# Patient Record
Sex: FEMALE | Race: WHITE | NOT HISPANIC OR LATINO | Employment: FULL TIME | URBAN - METROPOLITAN AREA
[De-identification: names, ages, dates, MRNs, and addresses within clinical notes are randomized per-mention and may not be internally consistent; named-entity substitution may affect disease eponyms.]

---

## 2017-01-11 ENCOUNTER — E-ADVICE (OUTPATIENT)
Dept: ENDOCRINOLOGY | Age: 27
End: 2017-01-11

## 2017-02-23 ENCOUNTER — APPOINTMENT (EMERGENCY)
Dept: RADIOLOGY | Facility: HOSPITAL | Age: 27
End: 2017-02-23
Payer: COMMERCIAL

## 2017-02-23 ENCOUNTER — HOSPITAL ENCOUNTER (EMERGENCY)
Facility: HOSPITAL | Age: 27
Discharge: HOME | End: 2017-02-23
Attending: EMERGENCY MEDICINE | Admitting: EMERGENCY MEDICINE
Payer: COMMERCIAL

## 2017-02-23 VITALS
SYSTOLIC BLOOD PRESSURE: 132 MMHG | HEART RATE: 118 BPM | BODY MASS INDEX: 21.26 KG/M2 | OXYGEN SATURATION: 94 % | TEMPERATURE: 99.6 F | RESPIRATION RATE: 20 BRPM | DIASTOLIC BLOOD PRESSURE: 72 MMHG | WEIGHT: 120 LBS

## 2017-02-23 DIAGNOSIS — J45.909 ASTHMA: Primary | ICD-10-CM

## 2017-02-23 LAB
ANION GAP SERPL CALCULATED.3IONS-SCNC: 9 MMOL/L (ref 4–13)
BASOPHILS # BLD AUTO: 0 THOUSANDS/ΜL (ref 0–0.1)
BASOPHILS NFR BLD AUTO: 1 % (ref 0–1)
BUN SERPL-MCNC: 7 MG/DL (ref 5–25)
CALCIUM SERPL-MCNC: 8.7 MG/DL (ref 8.3–10.1)
CHLORIDE SERPL-SCNC: 102 MMOL/L (ref 100–108)
CO2 SERPL-SCNC: 25 MMOL/L (ref 21–32)
CREAT SERPL-MCNC: 0.79 MG/DL (ref 0.6–1.3)
EOSINOPHIL # BLD AUTO: 0.4 THOUSAND/ΜL (ref 0–0.61)
EOSINOPHIL NFR BLD AUTO: 9 % (ref 0–6)
ERYTHROCYTE [DISTWIDTH] IN BLOOD BY AUTOMATED COUNT: 13.3 % (ref 11.6–15.1)
FLUAV AG SPEC QL IA: NEGATIVE
FLUAV AG SPEC QL: NORMAL
FLUBV AG SPEC QL IA: NEGATIVE
FLUBV AG SPEC QL: NORMAL
GFR SERPL CREATININE-BSD FRML MDRD: >60 ML/MIN/1.73SQ M
GLUCOSE SERPL-MCNC: 104 MG/DL (ref 65–140)
HCG SERPL QL: NEGATIVE
HCT VFR BLD AUTO: 41.3 % (ref 37–47)
HGB BLD-MCNC: 13.7 G/DL (ref 12–16)
INTERNAL QC RESULT: NORMAL
LYMPHOCYTES # BLD AUTO: 0.3 THOUSANDS/ΜL (ref 0.6–4.47)
LYMPHOCYTES NFR BLD AUTO: 6 % (ref 14–44)
MCH RBC QN AUTO: 28.3 PG (ref 27–31)
MCHC RBC AUTO-ENTMCNC: 33.2 G/DL (ref 31.4–37.4)
MCV RBC AUTO: 85 FL (ref 82–98)
MONOCYTES # BLD AUTO: 0.7 THOUSAND/ΜL (ref 0.17–1.22)
MONOCYTES NFR BLD AUTO: 15 % (ref 4–12)
NEUTROPHILS # BLD AUTO: 3.3 THOUSANDS/ΜL (ref 1.85–7.62)
NEUTS SEG NFR BLD AUTO: 69 % (ref 43–75)
NRBC BLD AUTO-RTO: 0 /100 WBCS
PLATELET # BLD AUTO: 160 THOUSANDS/UL (ref 130–400)
PMV BLD AUTO: 8.5 FL (ref 8.9–12.7)
POTASSIUM SERPL-SCNC: 3.7 MMOL/L (ref 3.5–5.3)
RBC # BLD AUTO: 4.84 MILLION/UL (ref 4.2–5.4)
RSV B RNA SPEC QL NAA+PROBE: NORMAL
SODIUM SERPL-SCNC: 136 MMOL/L (ref 136–145)
WBC # BLD AUTO: 4.8 THOUSAND/UL (ref 4.8–10.8)

## 2017-02-23 PROCEDURE — 85025 COMPLETE CBC W/AUTO DIFF WBC: CPT | Performed by: EMERGENCY MEDICINE

## 2017-02-23 PROCEDURE — 87400 INFLUENZA A/B EACH AG IA: CPT | Performed by: EMERGENCY MEDICINE

## 2017-02-23 PROCEDURE — 94664 DEMO&/EVAL PT USE INHALER: CPT

## 2017-02-23 PROCEDURE — 87798 DETECT AGENT NOS DNA AMP: CPT | Performed by: EMERGENCY MEDICINE

## 2017-02-23 PROCEDURE — 99285 EMERGENCY DEPT VISIT HI MDM: CPT

## 2017-02-23 PROCEDURE — 94640 AIRWAY INHALATION TREATMENT: CPT

## 2017-02-23 PROCEDURE — 36415 COLL VENOUS BLD VENIPUNCTURE: CPT | Performed by: EMERGENCY MEDICINE

## 2017-02-23 PROCEDURE — 71010 HB CHEST X-RAY 1 VIEW FRONTAL (PORTABLE): CPT

## 2017-02-23 PROCEDURE — 96361 HYDRATE IV INFUSION ADD-ON: CPT

## 2017-02-23 PROCEDURE — 80048 BASIC METABOLIC PNL TOTAL CA: CPT | Performed by: EMERGENCY MEDICINE

## 2017-02-23 PROCEDURE — 84703 CHORIONIC GONADOTROPIN ASSAY: CPT | Performed by: EMERGENCY MEDICINE

## 2017-02-23 PROCEDURE — 96374 THER/PROPH/DIAG INJ IV PUSH: CPT

## 2017-02-23 RX ORDER — METHYLPREDNISOLONE SODIUM SUCCINATE 125 MG/2ML
125 INJECTION, POWDER, LYOPHILIZED, FOR SOLUTION INTRAMUSCULAR; INTRAVENOUS ONCE
Status: COMPLETED | OUTPATIENT
Start: 2017-02-23 | End: 2017-02-23

## 2017-02-23 RX ORDER — ALBUTEROL SULFATE 90 UG/1
2 AEROSOL, METERED RESPIRATORY (INHALATION) EVERY 4 HOURS PRN
Qty: 1 INHALER | Refills: 0 | Status: SHIPPED | OUTPATIENT
Start: 2017-02-23 | End: 2017-03-25

## 2017-02-23 RX ORDER — AZITHROMYCIN 250 MG/1
250 TABLET, FILM COATED ORAL DAILY
Qty: 10 TABLET | Refills: 0 | Status: SHIPPED | OUTPATIENT
Start: 2017-02-23 | End: 2017-03-05

## 2017-02-23 RX ORDER — PREDNISONE 50 MG/1
50 TABLET ORAL DAILY
Qty: 5 TABLET | Refills: 0 | Status: SHIPPED | OUTPATIENT
Start: 2017-02-23 | End: 2017-02-28

## 2017-02-23 RX ADMIN — IPRATROPIUM BROMIDE 1 MG: 0.5 SOLUTION RESPIRATORY (INHALATION) at 08:03

## 2017-02-23 RX ADMIN — METHYLPREDNISOLONE SODIUM SUCCINATE 125 MG: 125 INJECTION, POWDER, FOR SOLUTION INTRAMUSCULAR; INTRAVENOUS at 08:03

## 2017-02-23 RX ADMIN — ALBUTEROL SULFATE 10 MG: 2.5 SOLUTION RESPIRATORY (INHALATION) at 08:02

## 2017-02-23 RX ADMIN — SODIUM CHLORIDE 1000 ML: 0.9 INJECTION, SOLUTION INTRAVENOUS at 08:01

## 2018-02-17 ENCOUNTER — HOSPITAL ENCOUNTER (EMERGENCY)
Facility: HOSPITAL | Age: 28
Discharge: HOME | End: 2018-02-17
Attending: EMERGENCY MEDICINE
Payer: COMMERCIAL

## 2018-02-17 ENCOUNTER — APPOINTMENT (EMERGENCY)
Dept: RADIOLOGY | Facility: HOSPITAL | Age: 28
End: 2018-02-17
Payer: COMMERCIAL

## 2018-02-17 VITALS
TEMPERATURE: 99.8 F | HEART RATE: 114 BPM | SYSTOLIC BLOOD PRESSURE: 115 MMHG | HEIGHT: 63 IN | WEIGHT: 120 LBS | OXYGEN SATURATION: 95 % | BODY MASS INDEX: 21.26 KG/M2 | RESPIRATION RATE: 20 BRPM | DIASTOLIC BLOOD PRESSURE: 68 MMHG

## 2018-02-17 DIAGNOSIS — J18.9 LEFT LOWER LOBE PNEUMONIA: Primary | ICD-10-CM

## 2018-02-17 DIAGNOSIS — R11.10 VOMITING: ICD-10-CM

## 2018-02-17 DIAGNOSIS — E87.6 HYPOKALEMIA: ICD-10-CM

## 2018-02-17 LAB
ALBUMIN SERPL BCP-MCNC: 3.6 G/DL (ref 3.5–5)
ALP SERPL-CCNC: 57 U/L (ref 46–116)
ALT SERPL W P-5'-P-CCNC: 18 U/L (ref 12–78)
ANION GAP SERPL CALCULATED.3IONS-SCNC: 10 MMOL/L (ref 4–13)
AST SERPL W P-5'-P-CCNC: 19 U/L (ref 5–45)
BASOPHILS # BLD AUTO: 0 THOUSANDS/ΜL (ref 0–0.1)
BASOPHILS NFR BLD AUTO: 0 % (ref 0–1)
BILIRUB SERPL-MCNC: 0.3 MG/DL (ref 0.2–1)
BILIRUB UR QL STRIP: NEGATIVE
BUN SERPL-MCNC: 5 MG/DL (ref 5–25)
CALCIUM SERPL-MCNC: 8.7 MG/DL (ref 8.3–10.1)
CHLORIDE SERPL-SCNC: 104 MMOL/L (ref 100–108)
CLARITY UR: CLEAR
CO2 SERPL-SCNC: 26 MMOL/L (ref 21–32)
COLOR UR: YELLOW
CREAT SERPL-MCNC: 0.89 MG/DL (ref 0.6–1.3)
EOSINOPHIL # BLD AUTO: 0 THOUSAND/ΜL (ref 0–0.61)
EOSINOPHIL NFR BLD AUTO: 0 % (ref 0–6)
ERYTHROCYTE [DISTWIDTH] IN BLOOD BY AUTOMATED COUNT: 13 % (ref 11.6–15.1)
EXT PREG TEST URINE: NEGATIVE
GFR SERPL CREATININE-BSD FRML MDRD: 88 ML/MIN/1.73SQ M
GLUCOSE SERPL-MCNC: 108 MG/DL (ref 65–140)
GLUCOSE UR STRIP-MCNC: NEGATIVE MG/DL
HCT VFR BLD AUTO: 41.3 % (ref 37–47)
HGB BLD-MCNC: 13.8 G/DL (ref 12–16)
HGB UR QL STRIP.AUTO: NEGATIVE
KETONES UR STRIP-MCNC: NEGATIVE MG/DL
LEUKOCYTE ESTERASE UR QL STRIP: NEGATIVE
LIPASE SERPL-CCNC: 163 U/L (ref 73–393)
LYMPHOCYTES # BLD AUTO: 0.3 THOUSANDS/ΜL (ref 0.6–4.47)
LYMPHOCYTES NFR BLD AUTO: 5 % (ref 14–44)
MCH RBC QN AUTO: 28.3 PG (ref 27–31)
MCHC RBC AUTO-ENTMCNC: 33.3 G/DL (ref 31.4–37.4)
MCV RBC AUTO: 85 FL (ref 82–98)
MONOCYTES # BLD AUTO: 0.5 THOUSAND/ΜL (ref 0.17–1.22)
MONOCYTES NFR BLD AUTO: 8 % (ref 4–12)
NEUTROPHILS # BLD AUTO: 4.9 THOUSANDS/ΜL (ref 1.85–7.62)
NEUTS SEG NFR BLD AUTO: 87 % (ref 43–75)
NITRITE UR QL STRIP: NEGATIVE
NRBC BLD AUTO-RTO: 0 /100 WBCS
PH UR STRIP.AUTO: 5.5 [PH] (ref 5–9)
PLATELET # BLD AUTO: 149 THOUSANDS/UL (ref 130–400)
PMV BLD AUTO: 9 FL (ref 8.9–12.7)
POTASSIUM SERPL-SCNC: 3.2 MMOL/L (ref 3.5–5.3)
PROT SERPL-MCNC: 6.9 G/DL (ref 6.4–8.2)
PROT UR STRIP-MCNC: NEGATIVE MG/DL
RBC # BLD AUTO: 4.85 MILLION/UL (ref 4.2–5.4)
SODIUM SERPL-SCNC: 140 MMOL/L (ref 136–145)
SP GR UR STRIP.AUTO: 1.02 (ref 1–1.03)
UROBILINOGEN UR QL STRIP.AUTO: 0.2 E.U./DL
WBC # BLD AUTO: 5.7 THOUSAND/UL (ref 4.8–10.8)

## 2018-02-17 PROCEDURE — 80053 COMPREHEN METABOLIC PANEL: CPT | Performed by: EMERGENCY MEDICINE

## 2018-02-17 PROCEDURE — 81003 URINALYSIS AUTO W/O SCOPE: CPT | Performed by: EMERGENCY MEDICINE

## 2018-02-17 PROCEDURE — 85025 COMPLETE CBC W/AUTO DIFF WBC: CPT | Performed by: EMERGENCY MEDICINE

## 2018-02-17 PROCEDURE — 83690 ASSAY OF LIPASE: CPT | Performed by: EMERGENCY MEDICINE

## 2018-02-17 PROCEDURE — 96375 TX/PRO/DX INJ NEW DRUG ADDON: CPT

## 2018-02-17 PROCEDURE — 96365 THER/PROPH/DIAG IV INF INIT: CPT

## 2018-02-17 PROCEDURE — 36415 COLL VENOUS BLD VENIPUNCTURE: CPT | Performed by: EMERGENCY MEDICINE

## 2018-02-17 PROCEDURE — 96361 HYDRATE IV INFUSION ADD-ON: CPT

## 2018-02-17 PROCEDURE — 81025 URINE PREGNANCY TEST: CPT | Performed by: EMERGENCY MEDICINE

## 2018-02-17 PROCEDURE — 99283 EMERGENCY DEPT VISIT LOW MDM: CPT

## 2018-02-17 RX ORDER — ONDANSETRON 4 MG/1
4 TABLET, ORALLY DISINTEGRATING ORAL EVERY 8 HOURS PRN
Qty: 15 TABLET | Refills: 0 | Status: SHIPPED | OUTPATIENT
Start: 2018-02-17 | End: 2018-04-21 | Stop reason: ALTCHOICE

## 2018-02-17 RX ORDER — METOCLOPRAMIDE HYDROCHLORIDE 5 MG/ML
10 INJECTION INTRAMUSCULAR; INTRAVENOUS ONCE
Status: COMPLETED | OUTPATIENT
Start: 2018-02-17 | End: 2018-02-17

## 2018-02-17 RX ORDER — ALBUTEROL SULFATE 90 UG/1
2 AEROSOL, METERED RESPIRATORY (INHALATION) EVERY 6 HOURS PRN
COMMUNITY
End: 2018-04-21

## 2018-02-17 RX ORDER — ALBUTEROL SULFATE 90 UG/1
2 AEROSOL, METERED RESPIRATORY (INHALATION) EVERY 6 HOURS PRN
Qty: 1 INHALER | Refills: 0 | Status: ON HOLD | OUTPATIENT
Start: 2018-02-17 | End: 2018-07-09

## 2018-02-17 RX ORDER — AMOXICILLIN 500 MG/1
500 CAPSULE ORAL 3 TIMES DAILY
Qty: 21 CAPSULE | Refills: 0 | Status: SHIPPED | OUTPATIENT
Start: 2018-02-17 | End: 2018-02-24

## 2018-02-17 RX ADMIN — METOCLOPRAMIDE 10 MG: 5 INJECTION, SOLUTION INTRAMUSCULAR; INTRAVENOUS at 12:20

## 2018-02-17 RX ADMIN — SODIUM CHLORIDE 1000 ML: 0.9 INJECTION, SOLUTION INTRAVENOUS at 10:21

## 2018-02-17 RX ADMIN — ONDANSETRON 8 MG: 2 INJECTION INTRAMUSCULAR; INTRAVENOUS at 10:33

## 2018-02-17 NOTE — DISCHARGE INSTRUCTIONS

## 2018-02-17 NOTE — ED PROVIDER NOTES
History  Chief Complaint   Patient presents with    Vomiting     Pt sts she was sick [facility] and tuesday then got better  Started again last night with vomiting and dizziness  Unable to hold anything down  Also with cough       History provided by:  Patient  Vomiting   Severity:  Moderate  Duration:  3 days  Timing:  Constant  Quality:  Stomach contents  Progression:  Worsening  Chronicity:  New  Recent urination:  Normal  Worsened by:  Nothing  Ineffective treatments:  None tried  Associated symptoms: cough, diarrhea and fever    Associated symptoms: no abdominal pain, no chills, no headaches, no myalgias and no sore throat        Prior to Admission Medications   Prescriptions Last Dose Informant Patient Reported? Taking? albuterol (PROVENTIL HFA,VENTOLIN HFA) 90 mcg/act inhaler   Yes Yes   Sig: Inhale 2 puffs every 6 (six) hours as needed for wheezing      Facility-Administered Medications: None       Past Medical History:   Diagnosis Date    Asthma        History reviewed  No pertinent surgical history  History reviewed  No pertinent family history  I have reviewed and agree with the history as documented  Social History   Substance Use Topics    Smoking status: Former Smoker    Smokeless tobacco: Never Used    Alcohol use Yes      Comment: socially        Review of Systems   Constitutional: Positive for fever  Negative for chills  HENT: Negative for rhinorrhea, sore throat and trouble swallowing  Eyes: Negative for pain  Respiratory: Positive for cough  Negative for shortness of breath, wheezing and stridor  Cardiovascular: Negative for chest pain and leg swelling  Gastrointestinal: Positive for diarrhea and vomiting  Negative for abdominal pain and nausea  Endocrine: Negative for polyuria  Genitourinary: Negative for dysuria, flank pain and urgency  Musculoskeletal: Negative for joint swelling, myalgias and neck stiffness  Skin: Negative for rash     Allergic/Immunologic: Negative for immunocompromised state  Neurological: Negative for dizziness, syncope, weakness, numbness and headaches  Psychiatric/Behavioral: Negative for confusion and suicidal ideas  All other systems reviewed and are negative  Physical Exam  ED Triage Vitals   Temperature Pulse Respirations Blood Pressure SpO2   02/17/18 1005 02/17/18 1005 02/17/18 1005 02/17/18 1005 02/17/18 1005   (!) 100 8 °F (38 2 °C) (!) 116 20 114/67 93 %      Temp src Heart Rate Source Patient Position - Orthostatic VS BP Location FiO2 (%)   -- 02/17/18 1208 02/17/18 1208 02/17/18 1208 --    Monitor Lying Left arm       Pain Score       02/17/18 1005       No Pain           Orthostatic Vital Signs  Vitals:    02/17/18 1005 02/17/18 1208   BP: 114/67 115/68   Pulse: (!) 116 (!) 114   Patient Position - Orthostatic VS:  Lying       Physical Exam   Constitutional: She is oriented to person, place, and time  She appears well-developed and well-nourished  HENT:   Head: Normocephalic and atraumatic  Eyes: EOM are normal  Pupils are equal, round, and reactive to light  Neck: Normal range of motion  Neck supple  Cardiovascular: Regular rhythm  Tachycardia present  Exam reveals no friction rub  No murmur heard  Pulmonary/Chest: Breath sounds normal  No respiratory distress  She has no wheezes  She has no rales  Abdominal: Soft  Bowel sounds are normal  She exhibits no distension  There is no tenderness  Musculoskeletal: Normal range of motion  She exhibits no edema or tenderness  Neurological: She is alert and oriented to person, place, and time  Skin: Skin is warm  No rash noted  Psychiatric: She has a normal mood and affect  Nursing note and vitals reviewed        ED Medications  Medications   sodium chloride 0 9 % bolus 2,000 mL (0 mL Intravenous Stopped 2/17/18 1237)   ondansetron (ZOFRAN) 8 mg in sodium chloride 0 9 % 50 mL IVPB (0 mg Intravenous Stopped 2/17/18 1050)   metoclopramide (REGLAN) injection 10 mg (10 mg Intravenous Given 2/17/18 1220)       Diagnostic Studies  Results Reviewed     Procedure Component Value Units Date/Time    UA w Reflex to Microscopic [[id number]]  (Normal) Collected:  02/17/18 1206    Lab Status:  Final result Specimen:  Urine from Urine, Clean Catch Updated:  02/17/18 1230     Color, UA Yellow     Clarity, UA Clear     Specific Gravity, UA 1 025     pH, UA 5 5     Leukocytes, UA Negative     Nitrite, UA Negative     Protein, UA Negative mg/dl      Glucose, UA Negative mg/dl      Ketones, UA Negative mg/dl      Urobilinogen, UA 0 2 E U /dl      Bilirubin, UA Negative     Blood, UA Negative    POCT pregnancy, urine [[id number]]  (Normal) Resulted:  02/17/18 1212    Lab Status:  Edited Result - FINAL Updated:  02/17/18 1215     EXT PREG TEST UR (Ref: Negative) negative    Comprehensive metabolic panel [[id number]]  (Abnormal) Collected:  02/17/18 1022    Lab Status:  Final result Specimen:  Blood from Arm, Left Updated:  02/17/18 1045     Sodium 140 mmol/L      Potassium 3 2 (L) mmol/L      Chloride 104 mmol/L      CO2 26 mmol/L      Anion Gap 10 mmol/L      BUN 5 mg/dL      Creatinine 0 89 mg/dL      Glucose 108 mg/dL      Calcium 8 7 mg/dL      AST 19 U/L      ALT 18 U/L      Alkaline Phosphatase 57 U/L      Total Protein 6 9 g/dL      Albumin 3 6 g/dL      Total Bilirubin 0 30 mg/dL      eGFR 88 ml/min/1 73sq m     Narrative:         National Kidney Disease Education Program recommendations are as follows:  GFR calculation is accurate only with a steady state creatinine  Chronic Kidney disease less than 60 ml/min/1 73 sq  meters  Kidney failure less than 15 ml/min/1 73 sq  meters      Lipase [[id number]]  (Normal) Collected:  02/17/18 1022    Lab Status:  Final result Specimen:  Blood from Arm, Left Updated:  02/17/18 1045     Lipase 163 u/L     CBC and differential [[id number]]  (Abnormal) Collected:  02/17/18 1022    Lab Status:  Final result Specimen:  Blood from Arm, Left Updated:  02/17/18 1033     WBC 5 70 Thousand/uL      RBC 4 85 Million/uL      Hemoglobin 13 8 g/dL      Hematocrit 41 3 %      MCV 85 fL      MCH 28 3 pg      MCHC 33 3 g/dL      RDW 13 0 %      MPV 9 0 fL      Platelets 910 Thousands/uL      nRBC 0 /100 WBCs      Neutrophils Relative 87 (H) %      Lymphocytes Relative 5 (L) %      Monocytes Relative 8 %      Eosinophils Relative 0 %      Basophils Relative 0 %      Neutrophils Absolute 4 90 Thousands/µL      Lymphocytes Absolute 0 30 (L) Thousands/µL      Monocytes Absolute 0 50 Thousand/µL      Eosinophils Absolute 0 00 Thousand/µL      Basophils Absolute 0 00 Thousands/µL                  No orders to display              Procedures  Procedures       Phone Contacts  ED Phone Contact    ED Course  ED Course                                MDM  Number of Diagnoses or Management Options  Hypokalemia: new and requires workup  Left lower lobe pneumonia [facility]): new and requires workup  Vomiting: new and requires workup  Diagnosis management comments: 26-year-old female presents emergency department multiple episodes of vomiting over the last several days in terms of diarrhea as well  Sent as an outpatient from a PCP as well as outpatient urgent care center for possible pneumonia  X-ray read by myself with noted left lower lobe infiltrate patient does have a fever here in the emergency department with noted cough  Treatment with amoxicillin at this point time  Plan will be for outpatient management given strict instructions when to return back emergency department  Pt re-examined and evaluated after testing and treatment  Spoke with the patient and feeling improved and sxs have resolved  Will discharge home with close f/u with pcp and instructed to return to the ED if sxs worsen or continue  Pt agrees with the plan for discharge and feels comfortable to go home with proper f/u  Advised to return for worsening or additional problems   Diagnostic tests were reviewed and questions answered  Diagnosis, care plan and treatment options were discussed  The patient understand instructions and will follow up as directed  Amount and/or Complexity of Data Reviewed  Clinical lab tests: ordered and reviewed  Tests in the radiology section of CPT®: ordered and reviewed      CritCare Time    Disposition  Final diagnoses:   Left lower lobe pneumonia ([address] )   Vomiting   Hypokalemia     Time reflects when diagnosis was documented in both MDM as applicable and the Disposition within this note     Time User Action Codes Description Comment    2/17/2018 12:22 PM Chapin Johnson Add [J18 1] Left lower lobe pneumonia ([address] )     2/17/2018 12:22 PM Chapin Arabia Add [R11 10] Vomiting     2/17/2018 12:23 PM Chapin Arabia Add [E87 6] Hypokalemia       ED Disposition     ED Disposition Condition Comment    Discharge  [address] discharge to home/self care  Condition at discharge: Stable        Follow-up Information    None       Discharge Medication List as of 2/17/2018 12:24 PM      START taking these medications    Details   amoxicillin (AMOXIL) 500 mg capsule Take 1 capsule (500 mg total) by mouth 3 (three) times a day for 7 days, Starting Sat 2/17/2018, Until Sat 2/24/2018, Print      ondansetron (ZOFRAN ODT) 4 mg disintegrating tablet Take 1 tablet (4 mg total) by mouth every 8 (eight) hours as needed for nausea or vomiting, Starting Sat 2/17/2018, Print         CONTINUE these medications which have NOT CHANGED    Details   albuterol (PROVENTIL HFA,VENTOLIN HFA) 90 mcg/act inhaler Inhale 2 puffs every 6 (six) hours as needed for wheezing, Historical Med           No discharge procedures on file      ED Provider  Electronically Signed by           Sonia Tariq DO  02/17/18 5744

## 2018-04-12 ENCOUNTER — OFFICE VISIT (OUTPATIENT)
Dept: FAMILY MEDICINE | Age: 28
End: 2018-04-12

## 2018-04-12 VITALS
HEIGHT: 62 IN | HEART RATE: 84 BPM | RESPIRATION RATE: 14 BRPM | DIASTOLIC BLOOD PRESSURE: 72 MMHG | TEMPERATURE: 98 F | SYSTOLIC BLOOD PRESSURE: 104 MMHG | BODY MASS INDEX: 33.86 KG/M2 | WEIGHT: 184 LBS

## 2018-04-12 DIAGNOSIS — R19.7 DIARRHEA, UNSPECIFIED TYPE: ICD-10-CM

## 2018-04-12 DIAGNOSIS — Z00.00 ANNUAL PHYSICAL EXAM: Primary | ICD-10-CM

## 2018-04-12 DIAGNOSIS — Z13.29 SCREENING FOR THYROID DISORDER: ICD-10-CM

## 2018-04-12 DIAGNOSIS — E55.9 VITAMIN D DEFICIENCY: Chronic | ICD-10-CM

## 2018-04-12 DIAGNOSIS — Z13.1 SCREENING FOR DIABETES MELLITUS (DM): ICD-10-CM

## 2018-04-12 PROCEDURE — 84443 ASSAY THYROID STIM HORMONE: CPT | Performed by: INTERNAL MEDICINE

## 2018-04-12 PROCEDURE — 80048 BASIC METABOLIC PNL TOTAL CA: CPT | Performed by: INTERNAL MEDICINE

## 2018-04-12 PROCEDURE — 82306 VITAMIN D 25 HYDROXY: CPT | Performed by: INTERNAL MEDICINE

## 2018-04-12 PROCEDURE — 99395 PREV VISIT EST AGE 18-39: CPT | Performed by: NURSE PRACTITIONER

## 2018-04-12 ASSESSMENT — ANXIETY QUESTIONNAIRES
5. BEING SO RESTLESS THAT IT IS HARD TO SIT STILL: NOT AT ALL
IF YOU CHECKED OFF ANY PROBLEMS ON THIS QUESTIONNAIRE, HOW DIFFICULT HAVE THESE PROBLEMS MADE IT FOR YOU TO DO YOUR WORK, TAKE CARE OF THINGS AT HOME, OR GET ALONG WITH OTHER PEOPLE: SOMEWHAT DIFFICULT
1. FEELING NERVOUS, ANXIOUS, OR ON EDGE: NOT AT ALL
3. WORRYING TOO MUCH ABOUT DIFFERENT THINGS: 0
6. BECOMING EASILY ANNOYED OR IRRITABLE: SEVERAL DAYS
2. NOT BEING ABLE TO STOP OR CONTROL WORRYING: 0
6. BECOMING EASILY ANNOYED OR IRRITABLE: 1
3. WORRYING TOO MUCH ABOUT DIFFERENT THINGS: NOT AT ALL
4. TROUBLE RELAXING: 0
5. BEING SO RESTLESS THAT IT IS HARD TO SIT STILL: 0
1. FEELING NERVOUS, ANXIOUS, OR ON EDGE: 0
7. FEELING AFRAID AS IF SOMETHING AWFUL MIGHT HAPPEN: NOT AT ALL
4. TROUBLE RELAXING: NOT AT ALL
GAD7 TOTAL SCORE: 1
2. NOT BEING ABLE TO STOP OR CONTROL WORRYING: NOT AT ALL
7. FEELING AFRAID AS IF SOMETHING AWFUL MIGHT HAPPEN: 0

## 2018-04-12 ASSESSMENT — PATIENT HEALTH QUESTIONNAIRE - PHQ9
CLINICAL INTERPRETATION OF PHQ2 SCORE: 0
SUM OF ALL RESPONSES TO PHQ9 QUESTIONS 1 TO 9: 7
SUM OF ALL RESPONSES TO PHQ QUESTIONS 1-9: 7
SUM OF ALL RESPONSES TO PHQ9 QUESTIONS 1 AND 2: 0
5. POOR APPETITE OR OVEREATING: SEVERAL DAYS

## 2018-04-13 ENCOUNTER — TELEPHONE (OUTPATIENT)
Dept: FAMILY MEDICINE | Age: 28
End: 2018-04-13

## 2018-04-13 DIAGNOSIS — E55.9 VITAMIN D DEFICIENCY: Primary | Chronic | ICD-10-CM

## 2018-04-13 LAB
25(OH)D3+25(OH)D2 SERPL-MCNC: 15.8 NG/ML (ref 30–100)
ANION GAP SERPL CALC-SCNC: 16 MMOL/L (ref 10–20)
BUN SERPL-MCNC: 8 MG/DL (ref 6–20)
BUN/CREAT SERPL: 13 (ref 7–25)
CALCIUM SERPL-MCNC: 9.3 MG/DL (ref 8.4–10.2)
CHLORIDE SERPL-SCNC: 107 MMOL/L (ref 98–107)
CO2 SERPL-SCNC: 22 MMOL/L (ref 21–32)
CREAT SERPL-MCNC: 0.61 MG/DL (ref 0.51–0.95)
FASTING STATUS PATIENT QL REPORTED: ABNORMAL HRS
GLUCOSE SERPL-MCNC: 101 MG/DL (ref 65–99)
POTASSIUM SERPL-SCNC: 3.7 MMOL/L (ref 3.4–5.1)
SODIUM SERPL-SCNC: 141 MMOL/L (ref 135–145)
TSH SERPL-ACNC: 1.15 MCUNITS/ML (ref 0.35–5)

## 2018-04-19 ENCOUNTER — TELEPHONE (OUTPATIENT)
Dept: FAMILY MEDICINE | Age: 28
End: 2018-04-19

## 2018-04-19 DIAGNOSIS — R19.7 DIARRHEA, UNSPECIFIED TYPE: Primary | ICD-10-CM

## 2018-04-21 ENCOUNTER — APPOINTMENT (EMERGENCY)
Dept: RADIOLOGY | Facility: HOSPITAL | Age: 28
End: 2018-04-21
Payer: COMMERCIAL

## 2018-04-21 ENCOUNTER — HOSPITAL ENCOUNTER (EMERGENCY)
Facility: HOSPITAL | Age: 28
Discharge: HOME | End: 2018-04-21
Attending: EMERGENCY MEDICINE | Admitting: EMERGENCY MEDICINE
Payer: COMMERCIAL

## 2018-04-21 VITALS
RESPIRATION RATE: 18 BRPM | OXYGEN SATURATION: 97 % | SYSTOLIC BLOOD PRESSURE: 127 MMHG | TEMPERATURE: 98.5 F | WEIGHT: 126.32 LBS | HEART RATE: 80 BPM | BODY MASS INDEX: 22.38 KG/M2 | DIASTOLIC BLOOD PRESSURE: 67 MMHG

## 2018-04-21 DIAGNOSIS — J45.909 ASTHMATIC BRONCHITIS: Primary | ICD-10-CM

## 2018-04-21 LAB
ALBUMIN SERPL BCP-MCNC: 4 G/DL (ref 3.5–5)
ALP SERPL-CCNC: 90 U/L (ref 46–116)
ALT SERPL W P-5'-P-CCNC: 25 U/L (ref 12–78)
ANION GAP SERPL CALCULATED.3IONS-SCNC: 10 MMOL/L (ref 4–13)
AST SERPL W P-5'-P-CCNC: 17 U/L (ref 5–45)
ATRIAL RATE: 87 BPM
BACTERIA UR QL AUTO: ABNORMAL /HPF
BASOPHILS # BLD AUTO: 0.04 THOUSANDS/ΜL (ref 0–0.1)
BASOPHILS NFR BLD AUTO: 1 % (ref 0–1)
BILIRUB SERPL-MCNC: 0.4 MG/DL (ref 0.2–1)
BILIRUB UR QL STRIP: NEGATIVE
BUN SERPL-MCNC: 7 MG/DL (ref 5–25)
CALCIUM SERPL-MCNC: 9.4 MG/DL (ref 8.3–10.1)
CHLORIDE SERPL-SCNC: 102 MMOL/L (ref 100–108)
CLARITY UR: ABNORMAL
CO2 SERPL-SCNC: 27 MMOL/L (ref 21–32)
COLOR UR: YELLOW
CREAT SERPL-MCNC: 0.94 MG/DL (ref 0.6–1.3)
EOSINOPHIL # BLD AUTO: 1.32 THOUSAND/ΜL (ref 0–0.61)
EOSINOPHIL NFR BLD AUTO: 18 % (ref 0–6)
ERYTHROCYTE [DISTWIDTH] IN BLOOD BY AUTOMATED COUNT: 13.3 % (ref 11.6–15.1)
EXT PREG TEST URINE: NEGATIVE
GFR SERPL CREATININE-BSD FRML MDRD: 83 ML/MIN/1.73SQ M
GLUCOSE SERPL-MCNC: 93 MG/DL (ref 65–140)
GLUCOSE UR STRIP-MCNC: NEGATIVE MG/DL
HCT VFR BLD AUTO: 46.1 % (ref 34.8–46.1)
HGB BLD-MCNC: 15.7 G/DL (ref 11.5–15.4)
HGB UR QL STRIP.AUTO: ABNORMAL
KETONES UR STRIP-MCNC: ABNORMAL MG/DL
LEUKOCYTE ESTERASE UR QL STRIP: ABNORMAL
LYMPHOCYTES # BLD AUTO: 1.38 THOUSANDS/ΜL (ref 0.6–4.47)
LYMPHOCYTES NFR BLD AUTO: 19 % (ref 14–44)
MCH RBC QN AUTO: 27.9 PG (ref 26.8–34.3)
MCHC RBC AUTO-ENTMCNC: 34.1 G/DL (ref 31.4–37.4)
MCV RBC AUTO: 82 FL (ref 82–98)
MONOCYTES # BLD AUTO: 0.54 THOUSAND/ΜL (ref 0.17–1.22)
MONOCYTES NFR BLD AUTO: 7 % (ref 4–12)
NEUTROPHILS # BLD AUTO: 3.97 THOUSANDS/ΜL (ref 1.85–7.62)
NEUTS SEG NFR BLD AUTO: 55 % (ref 43–75)
NITRITE UR QL STRIP: NEGATIVE
NON-SQ EPI CELLS URNS QL MICRO: ABNORMAL /HPF
P AXIS: 72 DEGREES
PH UR STRIP.AUTO: 6 [PH] (ref 4.5–8)
PLATELET # BLD AUTO: 313 THOUSANDS/UL (ref 149–390)
PMV BLD AUTO: 10.6 FL (ref 8.9–12.7)
POTASSIUM SERPL-SCNC: 4 MMOL/L (ref 3.5–5.3)
PR INTERVAL: 108 MS
PROT SERPL-MCNC: 8 G/DL (ref 6.4–8.2)
PROT UR STRIP-MCNC: NEGATIVE MG/DL
QRS AXIS: 84 DEGREES
QRSD INTERVAL: 72 MS
QT INTERVAL: 332 MS
QTC INTERVAL: 399 MS
RBC # BLD AUTO: 5.63 MILLION/UL (ref 3.81–5.12)
RBC #/AREA URNS AUTO: ABNORMAL /HPF
SODIUM SERPL-SCNC: 139 MMOL/L (ref 136–145)
SP GR UR STRIP.AUTO: 1.01 (ref 1–1.03)
T WAVE AXIS: -28 DEGREES
UROBILINOGEN UR QL STRIP.AUTO: 0.2 E.U./DL
VENTRICULAR RATE: 87 BPM
WBC # BLD AUTO: 7.25 THOUSAND/UL (ref 4.31–10.16)
WBC #/AREA URNS AUTO: ABNORMAL /HPF

## 2018-04-21 PROCEDURE — 36415 COLL VENOUS BLD VENIPUNCTURE: CPT | Performed by: PHYSICIAN ASSISTANT

## 2018-04-21 PROCEDURE — 96374 THER/PROPH/DIAG INJ IV PUSH: CPT

## 2018-04-21 PROCEDURE — 85025 COMPLETE CBC W/AUTO DIFF WBC: CPT | Performed by: PHYSICIAN ASSISTANT

## 2018-04-21 PROCEDURE — 71046 X-RAY EXAM CHEST 2 VIEWS: CPT

## 2018-04-21 PROCEDURE — 81001 URINALYSIS AUTO W/SCOPE: CPT

## 2018-04-21 PROCEDURE — 94640 AIRWAY INHALATION TREATMENT: CPT

## 2018-04-21 PROCEDURE — 81025 URINE PREGNANCY TEST: CPT | Performed by: PHYSICIAN ASSISTANT

## 2018-04-21 PROCEDURE — 99284 EMERGENCY DEPT VISIT MOD MDM: CPT

## 2018-04-21 PROCEDURE — 93010 ELECTROCARDIOGRAM REPORT: CPT | Performed by: INTERNAL MEDICINE

## 2018-04-21 PROCEDURE — 96361 HYDRATE IV INFUSION ADD-ON: CPT

## 2018-04-21 PROCEDURE — 80053 COMPREHEN METABOLIC PANEL: CPT | Performed by: PHYSICIAN ASSISTANT

## 2018-04-21 PROCEDURE — 93005 ELECTROCARDIOGRAM TRACING: CPT

## 2018-04-21 RX ORDER — ALBUTEROL SULFATE 2.5 MG/3ML
2.5 SOLUTION RESPIRATORY (INHALATION) ONCE
Status: COMPLETED | OUTPATIENT
Start: 2018-04-21 | End: 2018-04-21

## 2018-04-21 RX ORDER — METHYLPREDNISOLONE SODIUM SUCCINATE 125 MG/2ML
125 INJECTION, POWDER, LYOPHILIZED, FOR SOLUTION INTRAMUSCULAR; INTRAVENOUS ONCE
Status: COMPLETED | OUTPATIENT
Start: 2018-04-21 | End: 2018-04-21

## 2018-04-21 RX ORDER — ALBUTEROL SULFATE 90 UG/1
2 AEROSOL, METERED RESPIRATORY (INHALATION) EVERY 6 HOURS PRN
Qty: 1 INHALER | Refills: 0 | Status: SHIPPED | OUTPATIENT
Start: 2018-04-21 | End: 2018-07-08 | Stop reason: ALTCHOICE

## 2018-04-21 RX ORDER — PREDNISONE 20 MG/1
TABLET ORAL
Qty: 8 TABLET | Refills: 0 | Status: SHIPPED | OUTPATIENT
Start: 2018-04-21 | End: 2018-07-08 | Stop reason: ALTCHOICE

## 2018-04-21 RX ORDER — AZITHROMYCIN 250 MG/1
250 TABLET, FILM COATED ORAL DAILY
Qty: 4 TABLET | Refills: 0 | Status: SHIPPED | OUTPATIENT
Start: 2018-04-21 | End: 2018-04-25

## 2018-04-21 RX ORDER — AZITHROMYCIN 250 MG/1
500 TABLET, FILM COATED ORAL ONCE
Status: COMPLETED | OUTPATIENT
Start: 2018-04-21 | End: 2018-04-21

## 2018-04-21 RX ADMIN — ALBUTEROL SULFATE 2.5 MG: 2.5 SOLUTION RESPIRATORY (INHALATION) at 09:00

## 2018-04-21 RX ADMIN — IPRATROPIUM BROMIDE 0.5 MG: 0.5 SOLUTION RESPIRATORY (INHALATION) at 09:00

## 2018-04-21 RX ADMIN — IPRATROPIUM BROMIDE 0.5 MG: 0.5 SOLUTION RESPIRATORY (INHALATION) at 10:35

## 2018-04-21 RX ADMIN — METHYLPREDNISOLONE SODIUM SUCCINATE 125 MG: 125 INJECTION, POWDER, FOR SOLUTION INTRAMUSCULAR; INTRAVENOUS at 09:56

## 2018-04-21 RX ADMIN — AZITHROMYCIN DIHYDRATE 500 MG: 250 TABLET, FILM COATED ORAL at 10:33

## 2018-04-21 RX ADMIN — SODIUM CHLORIDE 1000 ML: 0.9 INJECTION, SOLUTION INTRAVENOUS at 09:09

## 2018-04-21 RX ADMIN — ALBUTEROL SULFATE 2.5 MG: 2.5 SOLUTION RESPIRATORY (INHALATION) at 10:35

## 2018-04-21 NOTE — ED PROVIDER NOTES
History  Chief Complaint   Patient presents with    Asthma     pt reports the past few days have SOB with excertion, last night and today SOB at rest, hx asthma, taking inhaler with minimal relief      58-year-old female presents to the emergency department with complaints of shortness of breath  States she has had worsening cough with shortness breath over the past several days  Using her inhaler with home with minimal relief  Denies fevers  States that cough is nonproductive  Notes that her inhaler ran out this morning  History provided by:  Patient   used: No        Prior to Admission Medications   Prescriptions Last Dose Informant Patient Reported? Taking? albuterol (PROVENTIL HFA,VENTOLIN HFA) 90 mcg/act inhaler   Yes No   Sig: Inhale 2 puffs every 6 (six) hours as needed for wheezing   albuterol (PROVENTIL HFA,VENTOLIN HFA) 90 mcg/act inhaler   No No   Sig: Inhale 2 puffs every 6 (six) hours as needed for wheezing or shortness of breath      Facility-Administered Medications: None       Past Medical History:   Diagnosis Date    Asthma        History reviewed  No pertinent surgical history  History reviewed  No pertinent family history  I have reviewed and agree with the history as documented  Social History   Substance Use Topics    Smoking status: Former Smoker    Smokeless tobacco: Never Used    Alcohol use No        Review of Systems   Constitutional: Negative for activity change, chills and fever  HENT: Negative for dental problem, drooling, ear pain, mouth sores, sinus pressure, sneezing, sore throat, tinnitus and trouble swallowing  Eyes: Negative for pain, discharge and itching  Respiratory: Positive for cough and shortness of breath  Negative for chest tightness and wheezing  Cardiovascular: Negative for chest pain  Skin: Negative for rash  Neurological: Negative for dizziness, light-headedness and headaches     All other systems reviewed and are negative  Physical Exam  ED Triage Vitals   Temperature Pulse Respirations Blood Pressure SpO2   04/21/18 0823 04/21/18 0823 04/21/18 0823 04/21/18 0826 04/21/18 0823   98 5 °F (36 9 °C) 66 22 125/68 95 %      Temp Source Heart Rate Source Patient Position - Orthostatic VS BP Location FiO2 (%)   04/21/18 0823 04/21/18 0823 04/21/18 0823 04/21/18 0823 --   Oral Monitor Sitting Right arm       Pain Score       04/21/18 0823       No Pain           Orthostatic Vital Signs  Vitals:    04/21/18 0823 04/21/18 0826   BP:  125/68   Pulse: 66    Patient Position - Orthostatic VS: Sitting Sitting       Physical Exam   Constitutional: She is oriented to person, place, and time  Vital signs are normal  She appears well-developed and well-nourished  No distress  HENT:   Head: Normocephalic and atraumatic  Right Ear: Hearing, tympanic membrane, external ear and ear canal normal    Left Ear: Hearing, tympanic membrane, external ear and ear canal normal    Nose: Nose normal    Mouth/Throat: Uvula is midline and oropharynx is clear and moist  No oropharyngeal exudate  Eyes: Conjunctivae, EOM and lids are normal    Cardiovascular: Normal rate and regular rhythm  Exam reveals no gallop and no friction rub  No murmur heard  Pulmonary/Chest: Effort normal  No respiratory distress  She has no decreased breath sounds  She has wheezes  She has rhonchi  She has no rales  She exhibits no tenderness  Abdominal: Soft  She exhibits no distension  There is no tenderness  Musculoskeletal: Normal range of motion  Neurological: She is alert and oriented to person, place, and time  Skin: Skin is warm and dry  She is not diaphoretic  Psychiatric: She has a normal mood and affect  Her behavior is normal    Vitals reviewed        ED Medications  Medications   sodium chloride 0 9 % bolus 1,000 mL (1,000 mL Intravenous New Bag 4/21/18 0909)   albuterol inhalation solution 2 5 mg (2 5 mg Nebulization Given 4/21/18 0900) ipratropium (ATROVENT) 0 02 % inhalation solution 0 5 mg (0 5 mg Nebulization Given 4/21/18 0900)   methylPREDNISolone sodium succinate (Solu-MEDROL) injection 125 mg (125 mg Intravenous Given 4/21/18 0956)   albuterol inhalation solution 2 5 mg (2 5 mg Nebulization Given 4/21/18 1035)   ipratropium (ATROVENT) 0 02 % inhalation solution 0 5 mg (0 5 mg Nebulization Given 4/21/18 1035)   azithromycin (ZITHROMAX) tablet 500 mg (500 mg Oral Given 4/21/18 1033)       Diagnostic Studies  Results Reviewed     Procedure Component Value Units Date/Time    Urine Microscopic [[id number]]  (Abnormal) Collected:  04/21/18 1008    Lab Status:  Final result Specimen:  Urine from Urine, Clean Catch Updated:  04/21/18 1026     RBC, UA 0-1 (A) /hpf      WBC, UA 4-10 (A) /hpf      Epithelial Cells Innumerable (A) /hpf      Bacteria, UA Moderate (A) /hpf     POCT pregnancy, urine [[id number]]  (Normal) Resulted:  04/21/18 1008    Lab Status:  Final result Updated:  04/21/18 1008     EXT PREG TEST UR (Ref: Negative) negative    ED Urine Macroscopic [[id number]]  (Abnormal) Collected:  04/21/18 1008    Lab Status:  Final result Specimen:  Urine Updated:  04/21/18 1008     Color, UA Yellow     Clarity, UA Slightly Cloudy     pH, UA 6 0     Leukocytes, UA Small (A)     Nitrite, UA Negative     Protein, UA Negative mg/dl      Glucose, UA Negative mg/dl      Ketones, UA 15 (1+) (A) mg/dl      Urobilinogen, UA 0 2 E U /dl      Bilirubin, UA Negative     Blood, UA Moderate (A)     Specific [place], UA 1 015    Narrative:       CLINITEK RESULT    Comprehensive metabolic panel [[id number]] Collected:  04/21/18 0909    Lab Status:  Final result Specimen:  Blood from Arm, Left Updated:  04/21/18 0942     Sodium 139 mmol/L      Potassium 4 0 mmol/L      Chloride 102 mmol/L      CO2 27 mmol/L      Anion Gap 10 mmol/L      BUN 7 mg/dL      Creatinine 0 94 mg/dL      Glucose 93 mg/dL      Calcium 9 4 mg/dL      AST 17 U/L      ALT 25 U/L      Alkaline Phosphatase 90 U/L      Total Protein 8 0 g/dL      Albumin 4 0 g/dL      Total Bilirubin 0 40 mg/dL      eGFR 83 ml/min/1 73sq m     Narrative:         National Kidney Disease Education Program recommendations are as follows:  GFR calculation is accurate only with a steady state creatinine  Chronic Kidney disease less than 60 ml/min/1 73 sq  meters  Kidney failure less than 15 ml/min/1 73 sq  meters  CBC and differential [[id number]]  (Abnormal) Collected:  04/21/18 0909    Lab Status:  Final result Specimen:  Blood from Arm, Left Updated:  04/21/18 0915     WBC 7 25 Thousand/uL      RBC 5 63 (H) Million/uL      Hemoglobin 15 7 (H) g/dL      Hematocrit 46 1 %      MCV 82 fL      MCH 27 9 pg      MCHC 34 1 g/dL      RDW 13 3 %      MPV 10 6 fL      Platelets 106 Thousands/uL      Neutrophils Relative 55 %      Lymphocytes Relative 19 %      Monocytes Relative 7 %      Eosinophils Relative 18 (H) %      Basophils Relative 1 %      Neutrophils Absolute 3 97 Thousands/µL      Lymphocytes Absolute 1 38 Thousands/µL      Monocytes Absolute 0 54 Thousand/µL      Eosinophils Absolute 1 32 (H) Thousand/µL      Basophils Absolute 0 04 Thousands/µL                  XR chest 2 views   ED Interpretation by Luz Maria Dodd PA-C (04/21 9085)   Clear lungs      Final Result by Lianne Josue MD (04/21 2574)      No acute cardiopulmonary disease              Workstation performed: [id number]                    Procedures  ECG 12 Lead Documentation  Date/Time: 4/21/2018 8:48 AM  Performed by: Jayden Paez  Authorized by: JAMES KASPER     Indications / Diagnosis:  SOB  ECG reviewed by me, the ED Provider: yes    Patient location:  ED  Previous ECG:     Previous ECG:  Compared to current    Comparison ECG info:  4/21/18    Similarity:  No change    Comparison to cardiac monitor: No    Interpretation:     Interpretation: abnormal    Rate:     ECG rate:  88    ECG rate assessment: normal    Rhythm:     Rhythm: sinus rhythm Ectopy:     Ectopy: none    QRS:     QRS axis:  Normal    QRS intervals:  Normal  Conduction:     Conduction: normal    ST segments:     ST segments:  Non-specific  T waves:     T waves: non-specific               Phone Contacts  ED Phone Contact    ED Course  ED Course as of Apr 21 1054   Sat Apr 21, 2018   1022  Spoke with patient regarding test results  Feeling better after 1st nebulizer treatment  Lung sounds improved  Will give 1 additional treatment prior to discharge  MDM  Number of Diagnoses or Management Options  Diagnosis management comments:  Differential diagnosis includes but not limited to:   Asthma exacerbation, bronchitis, pneumonia       Amount and/or Complexity of Data Reviewed  Clinical lab tests: ordered and reviewed  Tests in the radiology section of CPT®: ordered and reviewed  Independent visualization of images, tracings, or specimens: yes      CritCare Time    Disposition  Final diagnoses:   Asthmatic bronchitis     Time reflects when diagnosis was documented in both MDM as applicable and the Disposition within this note     Time User Action Codes Description Comment    4/21/2018 10:52 AM Anita Christianson Add [J45 909] Asthmatic bronchitis       ED Disposition     ED Disposition Condition Comment    Discharge  [address] discharge to home/self care  Condition at discharge: Stable        Follow-up Information    None       Patient's Medications   Discharge Prescriptions    AZITHROMYCIN (ZITHROMAX) 250 MG TABLET    Take 1 tablet (250 mg total) by mouth daily for 4 days       Start Date: 4/21/2018 End Date: 4/25/2018       Order Dose: 250 mg       Quantity: 4 tablet    Refills: 0    PREDNISONE 20 MG TABLET    3 tb po on day 1; 2 tb po on days 2-5       Start Date: 4/21/2018 End Date: --       Order Dose: --       Quantity: 8 tablet    Refills: 0     No discharge procedures on file      ED Provider  Electronically Signed by           Feliciano Quezada WARREN  04/21/18 1055

## 2018-06-19 ENCOUNTER — TELEPHONE (OUTPATIENT)
Dept: FAMILY MEDICINE | Age: 28
End: 2018-06-19

## 2018-07-08 ENCOUNTER — HOSPITAL ENCOUNTER (OUTPATIENT)
Facility: HOSPITAL | Age: 28
Setting detail: OBSERVATION
Discharge: HOME | End: 2018-07-09
Attending: EMERGENCY MEDICINE | Admitting: STUDENT IN AN ORGANIZED HEALTH CARE EDUCATION/TRAINING PROGRAM
Payer: COMMERCIAL

## 2018-07-08 ENCOUNTER — APPOINTMENT (EMERGENCY)
Dept: RADIOLOGY | Facility: HOSPITAL | Age: 28
End: 2018-07-08
Payer: COMMERCIAL

## 2018-07-08 DIAGNOSIS — J45.901 ASTHMA EXACERBATION: Primary | ICD-10-CM

## 2018-07-08 DIAGNOSIS — J18.9 LEFT LOWER LOBE PNEUMONIA: ICD-10-CM

## 2018-07-08 PROBLEM — R00.0 SINUS TACHYCARDIA: Status: ACTIVE | Noted: 2018-07-08

## 2018-07-08 LAB
ANION GAP SERPL CALCULATED.3IONS-SCNC: 5 MMOL/L (ref 4–13)
BASOPHILS # BLD AUTO: 0.05 THOUSANDS/ΜL (ref 0–0.1)
BASOPHILS NFR BLD AUTO: 1 % (ref 0–1)
BUN SERPL-MCNC: 5 MG/DL (ref 5–25)
CALCIUM SERPL-MCNC: 8.8 MG/DL (ref 8.3–10.1)
CHLORIDE SERPL-SCNC: 104 MMOL/L (ref 100–108)
CO2 SERPL-SCNC: 31 MMOL/L (ref 21–32)
CREAT SERPL-MCNC: 0.89 MG/DL (ref 0.6–1.3)
EOSINOPHIL # BLD AUTO: 1.71 THOUSAND/ΜL (ref 0–0.61)
EOSINOPHIL NFR BLD AUTO: 26 % (ref 0–6)
ERYTHROCYTE [DISTWIDTH] IN BLOOD BY AUTOMATED COUNT: 12.9 % (ref 11.6–15.1)
GFR SERPL CREATININE-BSD FRML MDRD: 88 ML/MIN/1.73SQ M
GLUCOSE SERPL-MCNC: 105 MG/DL (ref 65–140)
HCT VFR BLD AUTO: 44.7 % (ref 34.8–46.1)
HGB BLD-MCNC: 14.2 G/DL (ref 11.5–15.4)
IMM GRANULOCYTES # BLD AUTO: 0.01 THOUSAND/UL (ref 0–0.2)
IMM GRANULOCYTES NFR BLD AUTO: 0 % (ref 0–2)
LYMPHOCYTES # BLD AUTO: 2.33 THOUSANDS/ΜL (ref 0.6–4.47)
LYMPHOCYTES NFR BLD AUTO: 36 % (ref 14–44)
MCH RBC QN AUTO: 27.5 PG (ref 26.8–34.3)
MCHC RBC AUTO-ENTMCNC: 31.8 G/DL (ref 31.4–37.4)
MCV RBC AUTO: 87 FL (ref 82–98)
MONOCYTES # BLD AUTO: 0.47 THOUSAND/ΜL (ref 0.17–1.22)
MONOCYTES NFR BLD AUTO: 7 % (ref 4–12)
NEUTROPHILS # BLD AUTO: 2 THOUSANDS/ΜL (ref 1.85–7.62)
NEUTS SEG NFR BLD AUTO: 30 % (ref 43–75)
NRBC BLD AUTO-RTO: 0 /100 WBCS
PLATELET # BLD AUTO: 227 THOUSANDS/UL (ref 149–390)
PMV BLD AUTO: 10.1 FL (ref 8.9–12.7)
POTASSIUM SERPL-SCNC: 4 MMOL/L (ref 3.5–5.3)
RBC # BLD AUTO: 5.16 MILLION/UL (ref 3.81–5.12)
SODIUM SERPL-SCNC: 140 MMOL/L (ref 136–145)
WBC # BLD AUTO: 6.57 THOUSAND/UL (ref 4.31–10.16)

## 2018-07-08 PROCEDURE — 85025 COMPLETE CBC W/AUTO DIFF WBC: CPT | Performed by: EMERGENCY MEDICINE

## 2018-07-08 PROCEDURE — 71045 X-RAY EXAM CHEST 1 VIEW: CPT

## 2018-07-08 PROCEDURE — 36415 COLL VENOUS BLD VENIPUNCTURE: CPT | Performed by: EMERGENCY MEDICINE

## 2018-07-08 PROCEDURE — 87081 CULTURE SCREEN ONLY: CPT | Performed by: STUDENT IN AN ORGANIZED HEALTH CARE EDUCATION/TRAINING PROGRAM

## 2018-07-08 PROCEDURE — 93005 ELECTROCARDIOGRAM TRACING: CPT

## 2018-07-08 PROCEDURE — 80048 BASIC METABOLIC PNL TOTAL CA: CPT | Performed by: EMERGENCY MEDICINE

## 2018-07-08 PROCEDURE — 96374 THER/PROPH/DIAG INJ IV PUSH: CPT

## 2018-07-08 PROCEDURE — 99285 EMERGENCY DEPT VISIT HI MDM: CPT

## 2018-07-08 PROCEDURE — 94640 AIRWAY INHALATION TREATMENT: CPT

## 2018-07-08 PROCEDURE — 96361 HYDRATE IV INFUSION ADD-ON: CPT

## 2018-07-08 PROCEDURE — 99219 PR INITIAL OBSERVATION CARE/DAY 50 MINUTES: CPT | Performed by: STUDENT IN AN ORGANIZED HEALTH CARE EDUCATION/TRAINING PROGRAM

## 2018-07-08 RX ORDER — IPRATROPIUM BROMIDE AND ALBUTEROL SULFATE 2.5; .5 MG/3ML; MG/3ML
3 SOLUTION RESPIRATORY (INHALATION)
Status: DISCONTINUED | OUTPATIENT
Start: 2018-07-08 | End: 2018-07-08 | Stop reason: SDUPTHER

## 2018-07-08 RX ORDER — LEVALBUTEROL INHALATION SOLUTION 0.63 MG/3ML
0.63 SOLUTION RESPIRATORY (INHALATION) EVERY 8 HOURS PRN
Status: DISCONTINUED | OUTPATIENT
Start: 2018-07-08 | End: 2018-07-09 | Stop reason: HOSPADM

## 2018-07-08 RX ORDER — HEPARIN SODIUM 5000 [USP'U]/ML
5000 INJECTION, SOLUTION INTRAVENOUS; SUBCUTANEOUS EVERY 8 HOURS SCHEDULED
Status: DISCONTINUED | OUTPATIENT
Start: 2018-07-08 | End: 2018-07-09

## 2018-07-08 RX ORDER — MONTELUKAST SODIUM 10 MG/1
10 TABLET ORAL
COMMUNITY
End: 2018-10-01 | Stop reason: SDUPTHER

## 2018-07-08 RX ORDER — MAGNESIUM SULFATE HEPTAHYDRATE 40 MG/ML
2 INJECTION, SOLUTION INTRAVENOUS ONCE
Status: COMPLETED | OUTPATIENT
Start: 2018-07-08 | End: 2018-07-08

## 2018-07-08 RX ORDER — MONTELUKAST SODIUM 10 MG/1
10 TABLET ORAL
Status: DISCONTINUED | OUTPATIENT
Start: 2018-07-08 | End: 2018-07-09 | Stop reason: HOSPADM

## 2018-07-08 RX ORDER — METHYLPREDNISOLONE SODIUM SUCCINATE 125 MG/2ML
80 INJECTION, POWDER, LYOPHILIZED, FOR SOLUTION INTRAMUSCULAR; INTRAVENOUS ONCE
Status: COMPLETED | OUTPATIENT
Start: 2018-07-08 | End: 2018-07-08

## 2018-07-08 RX ORDER — IPRATROPIUM BROMIDE AND ALBUTEROL SULFATE 2.5; .5 MG/3ML; MG/3ML
SOLUTION RESPIRATORY (INHALATION)
Status: COMPLETED
Start: 2018-07-08 | End: 2018-07-08

## 2018-07-08 RX ORDER — IPRATROPIUM BROMIDE AND ALBUTEROL SULFATE 2.5; .5 MG/3ML; MG/3ML
3 SOLUTION RESPIRATORY (INHALATION)
Status: DISCONTINUED | OUTPATIENT
Start: 2018-07-09 | End: 2018-07-09 | Stop reason: HOSPADM

## 2018-07-08 RX ORDER — METHYLPREDNISOLONE SODIUM SUCCINATE 125 MG/2ML
60 INJECTION, POWDER, LYOPHILIZED, FOR SOLUTION INTRAMUSCULAR; INTRAVENOUS EVERY 8 HOURS SCHEDULED
Status: DISCONTINUED | OUTPATIENT
Start: 2018-07-09 | End: 2018-07-09 | Stop reason: HOSPADM

## 2018-07-08 RX ADMIN — IPRATROPIUM BROMIDE AND ALBUTEROL SULFATE 3 ML: .5; 3 SOLUTION RESPIRATORY (INHALATION) at 19:09

## 2018-07-08 RX ADMIN — MONTELUKAST SODIUM 10 MG: 10 TABLET, FILM COATED ORAL at 21:59

## 2018-07-08 RX ADMIN — METHYLPREDNISOLONE SODIUM SUCCINATE 80 MG: 125 INJECTION, POWDER, FOR SOLUTION INTRAMUSCULAR; INTRAVENOUS at 19:36

## 2018-07-08 RX ADMIN — SODIUM CHLORIDE 500 ML: 0.9 INJECTION, SOLUTION INTRAVENOUS at 19:36

## 2018-07-08 RX ADMIN — HEPARIN SODIUM 5000 UNITS: 5000 INJECTION, SOLUTION INTRAVENOUS; SUBCUTANEOUS at 21:59

## 2018-07-08 RX ADMIN — ALBUTEROL SULFATE 10 MG: 2.5 SOLUTION RESPIRATORY (INHALATION) at 19:36

## 2018-07-08 RX ADMIN — IPRATROPIUM BROMIDE AND ALBUTEROL SULFATE 3 ML: 2.5; .5 SOLUTION RESPIRATORY (INHALATION) at 19:09

## 2018-07-08 RX ADMIN — MAGNESIUM SULFATE HEPTAHYDRATE 2 G: 40 INJECTION, SOLUTION INTRAVENOUS at 21:02

## 2018-07-08 NOTE — ED PROVIDER NOTES
History  Chief Complaint   Patient presents with    Shortness of Breath     asthma attack     33 yowf c/o worsening sob/asthma x last couple days, much worse today  No relief with inhaler at home  No fever  + cough but not bringing up phlegm  No vomiting or diarrhea  Not a smoker  Not on prednisone  Last asthma exacerbation about 3 months ago  History provided by:  Patient   used: No    Shortness of Breath   Associated symptoms: cough and wheezing    Associated symptoms: no abdominal pain, no chest pain, no fever, no headaches, no rash and no vomiting        Prior to Admission Medications   Prescriptions Last Dose Informant Patient Reported? Taking? albuterol (PROVENTIL HFA,VENTOLIN HFA) 90 mcg/act inhaler   No No   Sig: Inhale 2 puffs every 6 (six) hours as needed for wheezing or shortness of breath   montelukast (SINGULAIR) 10 mg tablet   Yes Yes   Sig: Take 10 mg by mouth daily at bedtime      Facility-Administered Medications: None       Past Medical History:   Diagnosis Date    Asthma        History reviewed  No pertinent surgical history  History reviewed  No pertinent family history  I have reviewed and agree with the history as documented  Social History   Substance Use Topics    Smoking status: Former Smoker    Smokeless tobacco: Never Used    Alcohol use No        Review of Systems   Constitutional: Negative  Negative for fever  HENT: Negative  Eyes: Negative  Respiratory: Positive for cough, shortness of breath and wheezing  Cardiovascular: Negative  Negative for chest pain  Gastrointestinal: Negative  Negative for abdominal pain, diarrhea, nausea and vomiting  Genitourinary: Negative  Negative for dysuria and flank pain  Musculoskeletal: Negative  Negative for back pain and myalgias  Skin: Negative  Negative for rash and wound  Neurological: Negative  Negative for dizziness and headaches     Hematological: Does not bruise/bleed easily  Psychiatric/Behavioral: Negative  All other systems reviewed and are negative  Physical Exam  Physical Exam   Constitutional: She appears well-developed and well-nourished  No distress  HENT:   Head: Normocephalic and atraumatic  Eyes: Conjunctivae are normal  Pupils are equal, round, and reactive to light  Neck: Normal range of motion  Neck supple  Cardiovascular: Normal rate, regular rhythm and normal heart sounds  No murmur heard  Pulmonary/Chest: Effort normal  No respiratory distress  She has decreased breath sounds  She has wheezes  She has rhonchi  Abdominal: Soft  Bowel sounds are normal  She exhibits no distension  There is no tenderness  Musculoskeletal: Normal range of motion  She exhibits no edema or deformity  Neurological: She is alert  No cranial nerve deficit  Skin: Skin is warm and dry  No rash noted  She is not diaphoretic  No pallor  Psychiatric: She has a normal mood and affect  Her behavior is normal    Nursing note and vitals reviewed        Vital Signs  ED Triage Vitals   Temperature Pulse Respirations Blood Pressure SpO2   07/08/18 1908 07/08/18 1908 07/08/18 1908 07/08/18 1908 07/08/18 1908   98 3 °F (36 8 °C) (!) 112 (!) 28 140/74 (!) 87 %      Temp Source Heart Rate Source Patient Position - Orthostatic VS BP Location FiO2 (%)   07/08/18 1908 07/08/18 2044 07/08/18 1908 07/08/18 1908 --   Tympanic Monitor Sitting Right arm       Pain Score       07/08/18 1908       No Pain           Vitals:    07/08/18 1908 07/08/18 2044   BP: 140/74 143/87   Pulse: (!) 112 (!) 124   Patient Position - Orthostatic VS: Sitting Sitting       Visual Acuity      ED Medications  Medications   ipratropium-albuterol (DUO-NEB) 0 5-2 5 mg/3 mL inhalation solution 3 mL (3 mL Nebulization Given 7/8/18 1909)   magnesium sulfate 2 g/50 mL IVPB (premix) 2 g (not administered)   albuterol CONTINUOUS nebulizing solution (10 mg Nebulization Given 7/8/18 1936)   sodium chloride 0 9 % bolus 500 mL (0 mL Intravenous Stopped 7/8/18 2040)   methylPREDNISolone sodium succinate (Solu-MEDROL) injection 80 mg (80 mg Intravenous Given 7/8/18 1936)       Diagnostic Studies  Results Reviewed     Procedure Component Value Units Date/Time    Basic metabolic panel [[id number]] Collected:  07/08/18 1935    Lab Status:  Final result Specimen:  Blood from Arm, Right Updated:  07/08/18 1954     Sodium 140 mmol/L      Potassium 4 0 mmol/L      Chloride 104 mmol/L      CO2 31 mmol/L      Anion Gap 5 mmol/L      BUN 5 mg/dL      Creatinine 0 89 mg/dL      Glucose 105 mg/dL      Calcium 8 8 mg/dL      eGFR 88 ml/min/1 73sq m     Narrative:         National Kidney Disease Education Program recommendations are as follows:  GFR calculation is accurate only with a steady state creatinine  Chronic Kidney disease less than 60 ml/min/1 73 sq  meters  Kidney failure less than 15 ml/min/1 73 sq  meters      CBC and differential [[id number]]  (Abnormal) Collected:  07/08/18 1935    Lab Status:  Final result Specimen:  Blood from Arm, Right Updated:  07/08/18 1946     WBC 6 57 Thousand/uL      RBC 5 16 (H) Million/uL      Hemoglobin 14 2 g/dL      Hematocrit 44 7 %      MCV 87 fL      MCH 27 5 pg      MCHC 31 8 g/dL      RDW 12 9 %      MPV 10 1 fL      Platelets 702 Thousands/uL      nRBC 0 /100 WBCs      Neutrophils Relative 30 (L) %      Immat GRANS % 0 %      Lymphocytes Relative 36 %      Monocytes Relative 7 %      Eosinophils Relative 26 (H) %      Basophils Relative 1 %      Neutrophils Absolute 2 00 Thousands/µL      Immature Grans Absolute 0 01 Thousand/uL      Lymphocytes Absolute 2 33 Thousands/µL      Monocytes Absolute 0 47 Thousand/µL      Eosinophils Absolute 1 71 (H) Thousand/µL      Basophils Absolute 0 05 Thousands/µL                  XR chest 1 view portable    (Results Pending)              Procedures  Procedures       Phone Contacts  ED Phone Contact    ED Course                               MDM  Number of Diagnoses or Management Options  Asthma exacerbation:   Diagnosis management comments: Pt  S/p duoneb and then hour long albuterol and IV steroids, feels slightly better but on exam aeration only mildly improved and still very wheezy  Pulse ox 94-96% on RA  Advised admission  Discussed with hospitalist          Amount and/or Complexity of Data Reviewed  Review and summarize past medical records: yes      CritCare Time    Disposition  Final diagnoses:   Asthma exacerbation     Time reflects when diagnosis was documented in both MDM as applicable and the Disposition within this note     Time User Action Codes Description Comment    2/8/2321  9:27 PM David ARORA Add [N82 [id number]] Asthma exacerbation       ED Disposition     ED Disposition Condition Comment    Admit  Case was discussed with *Dr Jonelle Russell** and the patient's admission status was agreed to be Admission Status: observation status        Follow-up Information    None         Patient's Medications   Discharge Prescriptions    No medications on file     No discharge procedures on file      ED Provider  Electronically Signed by           Analia Wadsworth MD  22/39/17 7630

## 2018-07-09 VITALS
HEIGHT: 63 IN | BODY MASS INDEX: 22.66 KG/M2 | OXYGEN SATURATION: 90 % | RESPIRATION RATE: 18 BRPM | DIASTOLIC BLOOD PRESSURE: 73 MMHG | TEMPERATURE: 98 F | WEIGHT: 127.87 LBS | SYSTOLIC BLOOD PRESSURE: 140 MMHG | HEART RATE: 53 BPM

## 2018-07-09 PROBLEM — R00.0 SINUS TACHYCARDIA: Status: RESOLVED | Noted: 2018-07-08 | Resolved: 2018-07-09

## 2018-07-09 LAB
ANION GAP SERPL CALCULATED.3IONS-SCNC: 10 MMOL/L (ref 4–13)
ATRIAL RATE: 115 BPM
BASOPHILS # BLD AUTO: 0.02 THOUSANDS/ΜL (ref 0–0.1)
BASOPHILS NFR BLD AUTO: 0 % (ref 0–1)
BUN SERPL-MCNC: 7 MG/DL (ref 5–25)
CALCIUM SERPL-MCNC: 9.2 MG/DL (ref 8.3–10.1)
CHLORIDE SERPL-SCNC: 104 MMOL/L (ref 100–108)
CO2 SERPL-SCNC: 25 MMOL/L (ref 21–32)
CREAT SERPL-MCNC: 0.9 MG/DL (ref 0.6–1.3)
EOSINOPHIL # BLD AUTO: 0.03 THOUSAND/ΜL (ref 0–0.61)
EOSINOPHIL NFR BLD AUTO: 0 % (ref 0–6)
ERYTHROCYTE [DISTWIDTH] IN BLOOD BY AUTOMATED COUNT: 12.7 % (ref 11.6–15.1)
GFR SERPL CREATININE-BSD FRML MDRD: 87 ML/MIN/1.73SQ M
GLUCOSE P FAST SERPL-MCNC: 112 MG/DL (ref 65–99)
GLUCOSE SERPL-MCNC: 112 MG/DL (ref 65–140)
HCT VFR BLD AUTO: 44.6 % (ref 34.8–46.1)
HGB BLD-MCNC: 14.4 G/DL (ref 11.5–15.4)
IMM GRANULOCYTES # BLD AUTO: 0.01 THOUSAND/UL (ref 0–0.2)
IMM GRANULOCYTES NFR BLD AUTO: 0 % (ref 0–2)
LYMPHOCYTES # BLD AUTO: 1.47 THOUSANDS/ΜL (ref 0.6–4.47)
LYMPHOCYTES NFR BLD AUTO: 20 % (ref 14–44)
MCH RBC QN AUTO: 27.5 PG (ref 26.8–34.3)
MCHC RBC AUTO-ENTMCNC: 32.3 G/DL (ref 31.4–37.4)
MCV RBC AUTO: 85 FL (ref 82–98)
MONOCYTES # BLD AUTO: 0.4 THOUSAND/ΜL (ref 0.17–1.22)
MONOCYTES NFR BLD AUTO: 6 % (ref 4–12)
NEUTROPHILS # BLD AUTO: 5.26 THOUSANDS/ΜL (ref 1.85–7.62)
NEUTS SEG NFR BLD AUTO: 74 % (ref 43–75)
NRBC BLD AUTO-RTO: 0 /100 WBCS
P AXIS: 76 DEGREES
PLATELET # BLD AUTO: 264 THOUSANDS/UL (ref 149–390)
PMV BLD AUTO: 10.1 FL (ref 8.9–12.7)
POTASSIUM SERPL-SCNC: 4.2 MMOL/L (ref 3.5–5.3)
PR INTERVAL: 146 MS
QRS AXIS: 80 DEGREES
QRSD INTERVAL: 78 MS
QT INTERVAL: 290 MS
QTC INTERVAL: 401 MS
RBC # BLD AUTO: 5.23 MILLION/UL (ref 3.81–5.12)
SODIUM SERPL-SCNC: 139 MMOL/L (ref 136–145)
T WAVE AXIS: -53 DEGREES
VENTRICULAR RATE: 115 BPM
WBC # BLD AUTO: 7.19 THOUSAND/UL (ref 4.31–10.16)

## 2018-07-09 PROCEDURE — 85025 COMPLETE CBC W/AUTO DIFF WBC: CPT | Performed by: STUDENT IN AN ORGANIZED HEALTH CARE EDUCATION/TRAINING PROGRAM

## 2018-07-09 PROCEDURE — 80048 BASIC METABOLIC PNL TOTAL CA: CPT | Performed by: STUDENT IN AN ORGANIZED HEALTH CARE EDUCATION/TRAINING PROGRAM

## 2018-07-09 PROCEDURE — 94640 AIRWAY INHALATION TREATMENT: CPT

## 2018-07-09 PROCEDURE — 93010 ELECTROCARDIOGRAM REPORT: CPT | Performed by: INTERNAL MEDICINE

## 2018-07-09 PROCEDURE — 99243 OFF/OP CNSLTJ NEW/EST LOW 30: CPT | Performed by: INTERNAL MEDICINE

## 2018-07-09 PROCEDURE — 94760 N-INVAS EAR/PLS OXIMETRY 1: CPT

## 2018-07-09 PROCEDURE — 99217 PR OBSERVATION CARE DISCHARGE MANAGEMENT: CPT | Performed by: INTERNAL MEDICINE

## 2018-07-09 RX ORDER — FLUTICASONE FUROATE AND VILANTEROL 100; 25 UG/1; UG/1
1 POWDER RESPIRATORY (INHALATION) DAILY
Status: DISCONTINUED | OUTPATIENT
Start: 2018-07-09 | End: 2018-07-09 | Stop reason: HOSPADM

## 2018-07-09 RX ORDER — ALBUTEROL SULFATE 2.5 MG/3ML
2.5 SOLUTION RESPIRATORY (INHALATION) 4 TIMES DAILY
Qty: 360 ML | Refills: 0 | Status: SHIPPED | OUTPATIENT
Start: 2018-07-09 | End: 2018-08-08

## 2018-07-09 RX ORDER — PREDNISONE 20 MG/1
TABLET ORAL
Qty: 30 TABLET | Refills: 0 | Status: SHIPPED | OUTPATIENT
Start: 2018-07-09 | End: 2018-07-09

## 2018-07-09 RX ORDER — PREDNISONE 20 MG/1
TABLET ORAL
Qty: 30 TABLET | Refills: 0 | Status: SHIPPED | OUTPATIENT
Start: 2018-07-09 | End: 2018-09-15

## 2018-07-09 RX ORDER — ALBUTEROL SULFATE 90 UG/1
2 AEROSOL, METERED RESPIRATORY (INHALATION) EVERY 4 HOURS PRN
Qty: 1 INHALER | Refills: 0 | Status: SHIPPED | OUTPATIENT
Start: 2018-07-09 | End: 2018-10-01 | Stop reason: SDUPTHER

## 2018-07-09 RX ORDER — FLUTICASONE FUROATE AND VILANTEROL 100; 25 UG/1; UG/1
1 POWDER RESPIRATORY (INHALATION) DAILY
Qty: 1 INHALER | Refills: 7 | Status: SHIPPED | OUTPATIENT
Start: 2018-07-10 | End: 2018-10-01 | Stop reason: SDUPTHER

## 2018-07-09 RX ADMIN — IPRATROPIUM BROMIDE AND ALBUTEROL SULFATE 3 ML: .5; 3 SOLUTION RESPIRATORY (INHALATION) at 13:45

## 2018-07-09 RX ADMIN — HEPARIN SODIUM 5000 UNITS: 5000 INJECTION, SOLUTION INTRAVENOUS; SUBCUTANEOUS at 06:43

## 2018-07-09 RX ADMIN — IPRATROPIUM BROMIDE AND ALBUTEROL SULFATE 3 ML: .5; 3 SOLUTION RESPIRATORY (INHALATION) at 01:55

## 2018-07-09 RX ADMIN — IPRATROPIUM BROMIDE AND ALBUTEROL SULFATE 3 ML: .5; 3 SOLUTION RESPIRATORY (INHALATION) at 07:52

## 2018-07-09 RX ADMIN — METHYLPREDNISOLONE SODIUM SUCCINATE 60 MG: 125 INJECTION, POWDER, FOR SOLUTION INTRAMUSCULAR; INTRAVENOUS at 06:43

## 2018-07-09 RX ADMIN — FLUTICASONE FUROATE AND VILANTEROL TRIFENATATE 1 PUFF: 100; 25 POWDER RESPIRATORY (INHALATION) at 12:31

## 2018-07-09 RX ADMIN — METHYLPREDNISOLONE SODIUM SUCCINATE 60 MG: 125 INJECTION, POWDER, FOR SOLUTION INTRAMUSCULAR; INTRAVENOUS at 14:05

## 2018-07-09 NOTE — SOCIAL WORK
Pt will be discharged home today  Nebulizer ordered  SW delivered nebulizer to pt prior to discharge  No other needs expressed by pt

## 2018-07-09 NOTE — PLAN OF CARE
Problem: RESPIRATORY - ADULT  Goal: Achieves optimal ventilation and oxygenation  INTERVENTIONS:  - Assess for changes in respiratory status  - Assess for changes in mentation and behavior  - Position to facilitate oxygenation and minimize respiratory effort  - Oxygen administration by appropriate delivery method based on oxygen saturation (per order) or ABGs  - Initiate smoking cessation education as indicated  - Encourage broncho-pulmonary hygiene including cough, deep breathe, Incentive Spirometry  - Demonstrate the proper use of peak flow meters  - Assess and instruct to report SOB or any respiratory difficulty  - Respiratory Therapy support as indicated  Outcome: Progressing

## 2018-07-09 NOTE — DISCHARGE SUMMARY
Discharge Summary - [facility]    Patient Information: Asad Knox 33 y o  female MRN: [id number]  Unit/Bed#: [address] [place] [address] Encounter: [id number]    Discharging Physician / Practitioner: Yury Terrell MD  PCP: No primary care provider on file  Admission Date: 7/8/2018  Discharge Date: 07/09/18    Reason for Admission: Shortness of Breath (asthma attack)      Discharge Diagnoses:     Principal Problem:    Asthma exacerbation  Resolved Problems:    Sinus tachycardia        * Asthma exacerbation   Assessment & Plan    Three days of worsening of shortness of breath and cough  Patient reports that she wheezes all the time but over last 3 days, her breathing got worse  Denied any fever  Treated with bronchodilators with nebulizer, IV steroids  Clinically improved  Now breathing better and ambulating without worsening of shortness of breath  Denies chest pain  Tachycardia improved  Seen by Pulmonary, recommended to add maintenance Breo (coupons provided)  Patient will be continued on albuterol (nebulizer provided) 4 times a day,  Breo, taper of prednisone (60 mg daily for 3 days, 40 mg daily for 3 days, 20 mg daily for 3 days and then 10 mg daily for 4 days)  Patient was advised to monitor symptoms and peak flow  Appointment was scheduled with Pulmonary next week  Patient advised to seek medical attention in event of worsening of symptoms          Sinus tachycardia-resolved as of 7/9/2018   Assessment & Plan    Likely secondary to Albuterol use  Will switch to Xopenex and monitor on telemetry            Consultations During Hospital Stay:  IP CONSULT TO PULMONOLOGY    Procedures Performed:     · None    Significant Findings:     · None    Imaging while in hospital:    Xr Chest 1 View Portable    Result Date: 7/9/2018  Narrative: CHEST INDICATION:   asthma exac, sob  COMPARISON:  Chest x-ray dated 4/21/2018 EXAM PERFORMED/VIEWS:  XR CHEST PORTABLE FINDINGS: No pneumothorax is seen    The lungs are well-inflated but appear grossly clear  The cardiac and mediastinal contours appear unremarkable  Osseous structures appear within normal limits for patient age  Impression: Lungs appear well inflated but no acute cardiopulmonary disease is seen  Workstation performed: [id number]       Incidental Findings:   · None    Test Results Pending at Discharge (will require follow up):   · As per After Visit Summary     Outpatient Tests Requested:  · Follow up with Pulmonary in 1 week    Complications:  None    Hospital Course:     Ratna Overton is a 33 y o  female patient with history of asthma who originally presented to the hospital on 7/8/2018 due to worsening of shortness of breath cough over 3 days  Patient used her albuterol inhaler multiple times the without any improvement and she presented to emergency department  Patient was noted to be afebrile chest x-ray did not reveal any acute finding  Labs were unremarkable  Patient was treated with bronchodilators and IV steroids in emergency room and reported 50% improvement but patient continued to have wheezing  Hence admitted for further evaluation workup  Patient was admitted to the hospital    Treated with bronchodilators, IV steroids  Clinically improved  Reports improvement in shortness of breath and ambulating without any worsening of shortness of breath  Patient also reported that she does have ongoing wheezing on good days also  But currently feels better and able to ambulate without worsening of shortness of breath  Patient was seen by Pulmonary, recommended addition of Breo for maintenance  Advised to continue bronchodilators with nebulized treatment 4 times a day and prednisone taper  Patient was educated about the diagnosis, recommended follow-up in 1 week  Please see above list of diagnoses and related plan for additional information         Condition at Discharge: stable     Discharge Day Visit / Exam:     Subjective:  Feels better  Reports that her breathing is much better now  Ambulating without shortness of breath    Vitals: Blood Pressure: 140/73 (07/09/18 1401)  Pulse: (!) 53 (07/09/18 1401)  Temperature: 98 °F (36 7 °C) (07/09/18 1401)  Temp Source: Oral (07/09/18 1401)  Respirations: 18 (07/09/18 1401)  Height: 5' 3" (160 cm) (07/08/18 2137)  Weight - Scale: 58 kg (127 lb 13 9 oz) (07/09/18 0600)  SpO2: 94 % on room air  Exam:   Physical Exam   Constitutional: She appears well-developed  No distress  HENT:   Head: Normocephalic and atraumatic  Nose: Nose normal    Eyes: Conjunctivae and EOM are normal  Pupils are equal, round, and reactive to light  Neck: Normal range of motion  Neck supple  No JVD present  Cardiovascular: Normal rate, regular rhythm and normal heart sounds  Exam reveals no gallop and no friction rub  No murmur heard  Pulmonary/Chest: Effort normal  No accessory muscle usage  No tachypnea and no bradypnea  No respiratory distress  She has no decreased breath sounds  She has wheezes (Scattered)  She has no rhonchi  She has no rales  She exhibits no tenderness  Abdominal: Soft  Bowel sounds are normal  She exhibits no distension  There is no tenderness  There is no rebound and no guarding  Musculoskeletal: She exhibits no edema  Neurological: She is alert  No cranial nerve deficit  Skin: Skin is warm and dry  No rash noted  Psychiatric: She has a normal mood and affect  Discharge instructions/Information to patient and family:(Discharge Medications and Follow up):   See after visit summary for information provided to patient and family  Provisions for Follow-Up Care:  See after visit summary for information related to follow-up care and any pertinent home health orders  Disposition: Home    Planned Readmission:  No     Discharge Statement:  I spent 45 minutes discharging the patient  This time was spent on the day of discharge   I had direct contact with the patient on the day of discharge  Greater than 50% of the total time was spent examining patient, answering all patient questions, arranging and discussing plan of care with patient as well as directly providing post-discharge instructions  Additional time then spent on discharge activities  Discharge Medications:  See after visit summary for reconciled discharge medications provided to patient and family  ** Please Note:  Dictation voice to text software may have been used in the creation of this document   **

## 2018-07-09 NOTE — PLAN OF CARE
DISCHARGE PLANNING     Discharge to home or other facility with appropriate resources Progressing        INFECTION - ADULT     Absence or prevention of progression during hospitalization Progressing        Knowledge Deficit     Patient/family/caregiver demonstrates understanding of disease process, treatment plan, medications, and discharge instructions Progressing        PAIN - ADULT     Verbalizes/displays adequate comfort level or baseline comfort level Progressing        RESPIRATORY - ADULT     Achieves optimal ventilation and oxygenation Progressing

## 2018-07-09 NOTE — H&P
History and Physical - [facility]    Patient Information: Marilu Johnson 33 y o  female MRN: [id number]  Unit/Bed#: [address] [place] [address] Encounter: [id number]  Admitting Physician: Debra Bowers MD  PCP: No primary care provider on file  Date of Admission:  07/08/18    Chief Complaint:     Shortness of breath   of Present Illness:    Marilu Johnson is a 33 y o  female with a PMH of Asthma who presents with shortness of breath  She states that she was in her usual state of health until approximately 3 days ago when she developed shortness of breath  This progressively worsened with time  She also reported a mild productive cough and chest pain with deep inspiration  She reports this is her typical Asthma exacerbation  She came to the ED today as her symptoms were not improving  While here she received IV Solumedrol and Duonebs which improved her symptoms  Currently she reports feeling 50% better though still has some shortness of breath with exertion  No other complaints or concerns  Review of Systems:    Review of Systems   Constitutional: Negative for chills and fever  Respiratory: Positive for cough and shortness of breath  Cardiovascular: Positive for chest pain  Negative for leg swelling  Gastrointestinal: Negative for abdominal pain and blood in stool  Genitourinary: Negative for hematuria  Musculoskeletal: Negative for arthralgias  Neurological: Negative for syncope  Psychiatric/Behavioral: Negative for confusion  Past Medical and Surgical History:     Past Medical History:   Diagnosis Date    Asthma        History reviewed  No pertinent surgical history      Meds/Allergies:    all medications and allergies reviewed    Allergies: No Known Allergies  History:     Marital Status: Single   History   Alcohol Use No     History   Smoking Status    Former Smoker    Types: Cigarettes    Quit date: 01/2018   Smokeless Tobacco    Never Used     History   Drug Use No Family History: Mother: healthy   Father: Liver Cirrhosis, CAD    Physical Exam:     Vitals:   Blood Pressure: 143/87 (07/08/18 2044)  Pulse: (!) 124 (07/08/18 2044)  Temperature: 98 3 °F (36 8 °C) (07/08/18 1908)  Temp Source: Tympanic (07/08/18 1908)  Respirations: 20 (07/08/18 2044)  SpO2: 96 % (07/08/18 2044)    Physical Exam:   General: in no acute distress  HEENT: atraumatic, normocephalic  Skin: no jaundice  CVS: RRR, no murmurs appreciated  Lungs: diffuse wheezing bilaterally   Abdomen: soft, nondistended, bowel sounds normal, nontender upon palpation, no guarding or rebound tenderness  Extremities: no edema, no calf swelling or tenderness  Neuro: alert and oriented x3  Psych: calm, cooperative      Lab Results: I have personally reviewed pertinent reports  Results from last 7 days  Lab Units 07/08/18  1935   WBC Thousand/uL 6 57   HEMOGLOBIN g/dL 14 2   HEMATOCRIT % 44 7   PLATELETS Thousands/uL 227   NEUTROS PCT % 30*   LYMPHS PCT % 36   MONOS PCT % 7   EOS PCT % 26*       Results from last 7 days  Lab Units 07/08/18  1935   SODIUM mmol/L 140   POTASSIUM mmol/L 4 0   CHLORIDE mmol/L 104   CO2 mmol/L 31   BUN mg/dL 5   CREATININE mg/dL 0 89   CALCIUM mg/dL 8 8   GLUCOSE RANDOM mg/dL 105           Imaging:     No results found  Assessment/Plan    * Asthma exacerbation   Assessment & Plan    Pt received Solumedrol, Duonebs, Magnesium and Albuterol in the ED with some improvement in her symptoms  Will admit for Observation, start on scheduled Solumedrol, Singulair, Duonebs and Xopenex  Will also consult Pulmonary service to optimize her maintenance therapy as pt has had several bouts of Asthma exacerbation within the past year         Sinus tachycardia   Assessment & Plan    Likely secondary to Albuterol use    Will switch to Xopenex and monitor on telemetry            Hospital Problem List:     Principal Problem:    Asthma exacerbation  Active Problems:    Sinus tachycardia        VTE Prophylaxis: Heparin   Code Status: Level 1 - Full Code    Anticipated Length of Stay:  Patient will be admitted on an Observation basis with an anticipated length of stay of atleast 1 midnights  Total Time for Visit, including Counseling / Coordination of Care: 30 minutes  Greater than 50% of this total time spent on direct patient counseling and coordination of care

## 2018-07-09 NOTE — CONSULTS
Consultation - Pulmonary Medicine  Sukh Mckeon 33 y o  female MRN: [id number]  Unit/Bed#: [address] [place] [address] Encounter: [id number]    Assessment/Plan:    #1: Asthma Exacerbation  · Peak Flow:  · Predicted: 400  · Current: 200  · As per patient, notes baseline to be around 300  · Continue Solumedrol 60mg IV q8h  · Continue Duonebs q4/q2 prn  · Upon discharge:  · Steroid Taper as follows: (60mg for 3 days, 40mg for 3 days, 20mg for 3 days, 10mg for 4 days)  · Breo Inhaler and Albuterol NEB qid          Thank you for this consultation; we will be happy to follow with you     ______________________________________________________________________      History of Present Illness   Physician Requesting Consult: Mulugeta Tripp MD  Reason for Consult / Principal Problem: Asthma Exacerbation   HX and PE limited by: None    HPI:  Sukh Mckeon is a 33 y o  female  with a PMH of Asthma who presents with shortness of breath  As per patient, she has been noticing increased chest tightness and difficulty with breathing for the last couple of days  Notes that her symptoms are worse upon exertion  Notes that her symptoms do not improve with current asthma medication  Current medications include Singulair and Albuterol  States that she has been using her albuterol every day without relief  Additionally notes a productive cough without blood, and pain upon inspiration  Notes seasonal allergies  Notes smoking 5-6 cigarettes per day for limited amount of time, but quit this year  Denies any fever, chills, headaches , visual disturbances, chest pain, abdominal pain, N/V/D, and upper/lower extremity weakness  In the ED, patient received Solumedrol 80mg IV and Duoneb treatments and noted immediate relief  Patient was admitted to the floor and continued on Solumedrol and Duonebs  HPI    Smoking history:  Former Smoker; 5-6 cigarettes a day ; quit in January 2018      Review of Systems   Constitutional: Negative for chills, diaphoresis, fatigue and fever  HENT: Negative for sinus pain and sinus pressure  Respiratory: Positive for chest tightness, shortness of breath and wheezing  Negative for cough, choking and stridor  Cardiovascular: Negative for chest pain, palpitations and leg swelling  Gastrointestinal: Negative for abdominal pain, constipation, diarrhea, nausea and vomiting  Genitourinary: Negative for difficulty urinating  Past Medical/Surgical History  Past Medical History:   Diagnosis Date    Asthma      History reviewed  No pertinent surgical history      Social History  History   Alcohol Use No     History   Drug Use No     History   Smoking Status    Former Smoker    Types: Cigarettes    Quit date: 01/2018   Smokeless Tobacco    Never Used       Family History  Family History   Problem Relation Age of Onset    Heart disease Father     Asthma Brother     Cancer Maternal Grandmother     Diabetes Maternal Grandmother        Allergies  No Known Allergies    Home Meds:   Prescriptions Prior to Admission   Medication Sig Dispense Refill Last Dose    albuterol (PROVENTIL HFA,VENTOLIN HFA) 90 mcg/act inhaler Inhale 2 puffs every 6 (six) hours as needed for wheezing or shortness of breath 1 Inhaler 0 7/8/2018 at Unknown time    montelukast (SINGULAIR) 10 mg tablet Take 10 mg by mouth daily at bedtime   7/7/2018 at Unknown time     Current Meds:   Scheduled Meds:  Current Facility-Administered Medications:  fluticasone-vilanterol 1 puff Inhalation Daily Solange Joseph MD   ipratropium-albuterol 3 mL Nebulization Q6H Pili Rossi MD   levalbuterol 0 63 mg Nebulization Q8H PRN Pili Rossi MD   methylPREDNISolone sodium succinate 60 mg Intravenous Q8H [address] Thuy Figueroa MD   montelukast 10 mg Oral HS Pili Rossi MD     PRN Meds:  levalbuterol 0 63 mg Q8H PRN       ____________________________________________________________________    Objective   Vitals:   Temp:  [98 °F (36 7 °C)-99 °F (37 2 °C)] 98 °F (36 7 °C)  HR:  [] 53  Resp:  [18-28] 18  BP: (115-143)/(63-87) 140/73  Weight (last 2 days)     Date/Time   Weight    07/09/18 0600  58 (127 87)    07/08/18 2137  57 8 (127 43)            Oxygen Therapy  SpO2: 90 %    IV Infusions:        Nutrition:        Diet Orders            Start     Ordered    07/09/18 1119  Room Service  Once     Question:  Type of Service  Answer:  Room Service-Appropriate    07/09/18 1119    07/09/18 0237  Diet Regular; Regular House  Diet effective now     Question Answer Comment   Diet Type Regular    Regular Regular House    RD to adjust diet per protocol? No        07/09/18 0237            Ins/Outs:   I/O       07/07 0701 - 07/08 0700 07/08 0701 - 07/09 0700 07/09 0701 - 07/10 0700    IV Piggyback  556 7     Total Intake(mL/kg)  556 7 (9 6)     Net   +556 7                     Lines/Drains:  Invasive Devices     Peripheral Intravenous Line            Peripheral IV 07/08/18 Left Antecubital less than 1 day                ____________________________________________________________________      Physical Exam   Constitutional: She is oriented to person, place, and time  She appears well-developed and well-nourished  No distress  HENT:   Head: Normocephalic and atraumatic  Eyes: Conjunctivae and EOM are normal  Pupils are equal, round, and reactive to light  Right eye exhibits no discharge  Left eye exhibits no discharge  No scleral icterus  Neck: Normal range of motion  Neck supple  Cardiovascular: Normal rate, regular rhythm, normal heart sounds and intact distal pulses  Exam reveals no gallop and no friction rub  No murmur heard  Pulmonary/Chest: Effort normal  No respiratory distress  She has wheezes  She has no rales  She exhibits no tenderness  Abdominal: Soft  Bowel sounds are normal  She exhibits no distension and no mass  There is no tenderness  There is no rebound and no guarding  Musculoskeletal: Normal range of motion   She exhibits no edema, tenderness or deformity  Neurological: She is alert and oriented to person, place, and time  Skin: She is not diaphoretic  Psychiatric: She has a normal mood and affect  Her behavior is normal  Judgment and thought content normal        ____________________________________________________________________    Labs:   CBC:   Results from last 7 days  Lab Units 07/09/18  0649 07/08/18  1935   WBC Thousand/uL 7 19 6 57   HEMOGLOBIN g/dL 14 4 14 2   HEMATOCRIT % 44 6 44 7   MCV fL 85 87   PLATELETS Thousands/uL 264 227     CMP:   Results from last 7 days  Lab Units 07/09/18  0649 07/08/18  1935   SODIUM mmol/L 139 140   POTASSIUM mmol/L 4 2 4 0   CHLORIDE mmol/L 104 104   CO2 mmol/L 25 31   BUN mg/dL 7 5   CREATININE mg/dL 0 90 0 89   GLUCOSE RANDOM mg/dL 112 105   CALCIUM mg/dL 9 2 8 8   EGFR ml/min/1 73sq m 87 88     Magnesium:     Phosphorous:     Troponin:     PT/INR:     Lactic Acid:     BNP:     ABG:      Procalcitonin: Invalid input(s): PROCALCITONIN    Imaging:  XR chest 1 view portable   Final Result      Lungs appear well inflated but no acute cardiopulmonary disease is seen  Workstation performed: [id number]               EKG:  Sinus Tachycardia    Micro: No results found for: Norlin Kelly, WOUNDCULT, SPUTUMCULTUR, MRSACULTURE, BRONCHIALCUL     ____________________________________________________________________      Code Status: Level 1 - Full Code

## 2018-07-09 NOTE — SOCIAL WORK
Pt lives independnetly, no dme or hhc needs, pt uses [facility] in [facility] for rx needs  Dcp is to home when stable  DASH discussion completed  Discussed goals of making sure pt's  needs are met upon discharge, pt's preferences are taken into account, pt understands health condition, medications and symptoms to watch for after returning home and pt is aware of any follow up appointments recommended by hospital physician

## 2018-07-09 NOTE — ASSESSMENT & PLAN NOTE
Three days of worsening of shortness of breath and cough  Patient reports that she wheezes all the time but over last 3 days, her breathing got worse  Denied any fever  Treated with bronchodilators with nebulizer, IV steroids  Clinically improved  Now breathing better and ambulating without worsening of shortness of breath  Denies chest pain  Tachycardia improved  Seen by Pulmonary, recommended to add maintenance Breo (coupons provided)  Patient will be continued on albuterol (nebulizer provided) 4 times a day,  Breo, taper of prednisone (60 mg daily for 3 days, 40 mg daily for 3 days, 20 mg daily for 3 days and then 10 mg daily for 4 days)  Patient was advised to monitor symptoms and peak flow  Appointment was scheduled with Pulmonary next week    Patient advised to seek medical attention in event of worsening of symptoms

## 2018-07-09 NOTE — ED NOTES
Neb complete, pt states she feels better  On CM sinus tach with episodes of bigeminy   Dr Duran Lute aware     Diego Maloney RN  07/08/18 [id number]       Diego Maloney RN  07/08/18 3841

## 2018-07-09 NOTE — NURSING NOTE
Patient discharged to home  Mother and boyfriend present at discharge  Discharge instructions reviewed with patient  Scripts provided along with nebulizer machine  1 prescription already called into pharmacy  Patient aware of all follow up appointments

## 2018-07-10 LAB — MRSA NOSE QL CULT: NORMAL

## 2018-07-17 ENCOUNTER — OFFICE VISIT (OUTPATIENT)
Dept: PULMONOLOGY | Facility: MEDICAL CENTER | Age: 28
End: 2018-07-17
Payer: COMMERCIAL

## 2018-07-17 VITALS
DIASTOLIC BLOOD PRESSURE: 82 MMHG | HEIGHT: 64 IN | TEMPERATURE: 98.3 F | HEART RATE: 72 BPM | WEIGHT: 128 LBS | SYSTOLIC BLOOD PRESSURE: 122 MMHG | BODY MASS INDEX: 21.85 KG/M2 | OXYGEN SATURATION: 98 % | RESPIRATION RATE: 12 BRPM

## 2018-07-17 DIAGNOSIS — J45.30 MILD PERSISTENT ASTHMA WITHOUT COMPLICATION: ICD-10-CM

## 2018-07-17 DIAGNOSIS — R06.02 SOB (SHORTNESS OF BREATH): Primary | ICD-10-CM

## 2018-07-17 DIAGNOSIS — J45.31 MILD PERSISTENT ASTHMA WITH ACUTE EXACERBATION: ICD-10-CM

## 2018-07-17 PROCEDURE — 94060 EVALUATION OF WHEEZING: CPT | Performed by: INTERNAL MEDICINE

## 2018-07-17 PROCEDURE — 99213 OFFICE O/P EST LOW 20 MIN: CPT | Performed by: INTERNAL MEDICINE

## 2018-07-17 RX ORDER — ALBUTEROL SULFATE 2.5 MG/3ML
2.5 SOLUTION RESPIRATORY (INHALATION) EVERY 6 HOURS PRN
Status: SHIPPED | OUTPATIENT
Start: 2018-07-17 | End: 2019-07-17

## 2018-07-17 RX ADMIN — ALBUTEROL SULFATE 2.5 MG: 2.5 SOLUTION RESPIRATORY (INHALATION) at 16:10

## 2018-07-17 NOTE — LETTER
July 17, 2018     Patient: Luh Dinh   YOB: 1990   Date of Visit: 7/17/2018       To Whom it May Concern:    Perla Sandoval is under my professional care  She was seen in my office on 7/17/2018  I advised that Miri Araujo be limited to light activities at work for the next 2 weeks  If you have any questions or concerns, please don't hesitate to call           Sincerely,          Carlos Joel DO        CC: No Recipients

## 2018-07-17 NOTE — LETTER
August 1, 2018     Patient: Sara Ibarra   YOB: 1990   Date of Visit: 7/17/2018       To Whom it May Concern:    Alex Encarnacion is under my professional care  She was seen in my office on 7/17/2018  She may return to full duties at work on August 1st, 2018  If you have any questions or concerns, please don't hesitate to call           Sincerely,          Treva Cárdenas DO        CC: No Recipients

## 2018-07-21 PROBLEM — J45.31 MILD PERSISTENT ASTHMA WITH ACUTE EXACERBATION: Status: ACTIVE | Noted: 2018-07-21

## 2018-07-21 PROBLEM — J45.901 ASTHMA EXACERBATION: Status: RESOLVED | Noted: 2018-07-08 | Resolved: 2018-07-21

## 2018-07-22 NOTE — PROGRESS NOTES
Assessment/Plan:     Problem List Items Addressed This Visit        Respiratory    Mild persistent asthma with acute exacerbation     Gerardo Lesch has mild persistent asthma was recently hospitalized in July for acute exacerbation  Did resistant overnight stay she improved with IV steroids and bronchodilators  She has frequent symptoms weekly from her asthma  She has not been using any type of maintenance steroid inhaler since early this year  She used to be on Advair meter dose inhaler  Spirometry today shows essentially normal lung volumes with only minimal reduction in FEV1 which could be due to air trapping  She has nearly completed her steroid taper for her asthma exacerbation and is feeling good  She is having only minimal exertional dyspnea  As she will continue on Breo 100 mcg 1 puff daily and can use albuterol as her rescue inhaler  She also has a nebulizer at home if needed  She starts having any shortness of breath she will monitor peak flow rates and she can contact our office  Relevant Medications    albuterol inhalation solution 2 5 mg      Other Visit Diagnoses     SOB (shortness of breath)    -  Primary    Relevant Orders    POCT spirometry (Completed)    Mild persistent asthma without complication        Relevant Medications    albuterol inhalation solution 2 5 mg            Return in about 1 year (around 7/17/2019)  All questions are answered to the patient's satisfaction and understanding  She verbalizes understanding  She is encouraged to call with any further questions or concerns  Portions of the record may have been created with voice recognition software  Occasional wrong word or "sound a like" substitutions may have occurred due to the inherent limitations of voice recognition software  Read the chart carefully and recognize, using context, where substitutions have occurred      Electronically Signed by Alison Adair DO    ______________________________________________________________________    Chief Complaint:   Chief Complaint   Patient presents with    Follow-up     HFU: Asthma    Shortness of Breath     improved    Cough     occurs at night and whe she gets out of breath    Wheezing       Patient ID: Hari Bernard is a 33 y o  y o  female has a past medical history of Asthma  7/17/2018  HPI     Hari Bernard presents for follow-up visit after overnight stay at the hospital from July 8 to July 9, 2018 for shortness of breath and exacerbation of her asthma  She was discharged home with a tapering dose of prednisone starting at 60 mg daily for 3 days and decreased by 20 mg every 4th day and after 20 mg dose she was to take 10 mg daily for 4 days  She also was given a prescription for Breo 100 mcg 1 puff daily to use and was given a peak flow meter to monitor her peak flow rates  She has improved and is not having any shortness of breath now  She also denies any cough or wheezing  She states normally her peak flow rates never go above 300 liters/minute  Hari Bernard does have a history of asthma that developed when she was about 20years old  She did use Advair meter dose inhaler in the past but this was discontinued last year  She also stated she did not have good prescription coverage for  She does take montelukast 10 mg per day which does help her asthma  She does have mild seasonal allergies and she does have a brother who has asthma as well  She quit smoking in January and smoked no more than 1/2 pack of cigarettes per day for few years        Review of Systems   Constitutional: Negative for chills, fever and unexpected weight change  HENT: Negative for congestion, rhinorrhea and sore throat  Eyes: Negative for discharge and redness  Respiratory: Negative for cough, shortness of breath and wheezing  Cardiovascular: Negative for chest pain, palpitations and leg swelling     Gastrointestinal: Negative for abdominal distention, abdominal pain and nausea  Endocrine: Negative for polydipsia and polyphagia  Genitourinary: Negative for dysuria  Musculoskeletal: Negative for joint swelling and myalgias  Skin: Negative for rash  Neurological: Negative for light-headedness  Smoking history: She reports that she quit smoking about 7 months ago  Her smoking use included Cigarettes  She has a 2 50 pack-year smoking history  She has never used smokeless tobacco     The following portions of the patient's history were reviewed and updated as appropriate: allergies, current medications, past family history, past medical history, past social history, past surgical history and problem list     Immunization History   Administered Date(s) Administered    Pneumococcal Polysaccharide PPV23 12/21/2016    Tdap 11/21/2016     Current Outpatient Prescriptions   Medication Sig Dispense Refill    albuterol (2 5 mg/3 mL) 0 083 % nebulizer solution Take 1 vial (2 5 mg total) by nebulization 4 (four) times a day for 30 days 360 mL 0    albuterol (PROVENTIL HFA,VENTOLIN HFA) 90 mcg/act inhaler Inhale 2 puffs every 4 (four) hours as needed for wheezing or shortness of breath 1 Inhaler 0    fluticasone-vilanterol (BREO ELLIPTA) 100-25 mcg/inh inhaler Inhale 1 puff daily Rinse mouth after use  1 Inhaler 7    montelukast (SINGULAIR) 10 mg tablet Take 10 mg by mouth daily at bedtime      predniSONE 20 mg tablet Take as directed (see calender)   3tabs/day for 3 days, 2 tabs/day for 3 days then 1 tab/day for 3 days then 1/2 tab/day for 4 days 30 tablet 0     Current Facility-Administered Medications   Medication Dose Route Frequency Provider Last Rate Last Dose    albuterol inhalation solution 2 5 mg  2 5 mg Nebulization Q6H PRN Treva Cárdenas DO   2 5 mg at 07/17/18 1610     Allergies: Patient has no known allergies      Objective:  Vitals:    07/17/18 1539   BP: 122/82   BP Location: Left arm   Patient Position: Sitting   Cuff Size: Adult   Pulse: 72   Resp: 12   Temp: 98 3 °F (36 8 °C)   TempSrc: Oral   SpO2: 98%   Weight: 58 1 kg (128 lb)   Height: 5' 4" (1 626 m)   Oxygen Therapy  SpO2: 98 %    Wt Readings from Last 3 Encounters:   07/17/18 58 1 kg (128 lb)   07/09/18 58 kg (127 lb 13 9 oz)   04/21/18 57 3 kg (126 lb 5 2 oz)     Body mass index is 21 97 kg/m²  Physical Exam   Constitutional: She is oriented to person, place, and time  She appears well-developed and well-nourished  No distress  HENT:   Head: Normocephalic  Nose: Nose normal    Mouth/Throat: Oropharynx is clear and moist  No oropharyngeal exudate  Eyes: Conjunctivae are normal  Pupils are equal, round, and reactive to light  Neck: Neck supple  No JVD present  No tracheal deviation present  Cardiovascular: Normal rate, regular rhythm and normal heart sounds  Pulmonary/Chest: Effort normal  She has no wheezes  Lung sounds are clear to auscultation   Abdominal: Soft  She exhibits no distension  There is no tenderness  There is no guarding  Musculoskeletal: She exhibits no edema  Lymphadenopathy:     She has no cervical adenopathy  Neurological: She is alert and oriented to person, place, and time  Skin: Skin is warm and dry  No rash noted  Psychiatric: She has a normal mood and affect  Her behavior is normal  Thought content normal          Office Spirometry Results:  Done today    Prebronchodilator;  FVC - 2 96 L   78%  FEV1 - 2 64 L  82%  FEV1/FVC% - 89%  PEF - 300 l/m  72% predicted    Postbronchodilator;  FVC - 3 05 L   81%  FEV1 - 2 41 L   75%  FEV1/FVC% - 79%    Minimal decrease in FEV1 could possibly be due to air trapping obstructive index is normal and peak flow rate is mildly decreased at 72% of predicted    No significant change in lung volumes after bronchodilator

## 2018-08-02 NOTE — ASSESSMENT & PLAN NOTE
Ronak Barkley has mild persistent asthma was recently hospitalized in July for acute exacerbation  Did resistant overnight stay she improved with IV steroids and bronchodilators  She has frequent symptoms weekly from her asthma  She has not been using any type of maintenance steroid inhaler since early this year  She used to be on Advair meter dose inhaler  Spirometry today shows essentially normal lung volumes with only minimal reduction in FEV1 which could be due to air trapping  She has nearly completed her steroid taper for her asthma exacerbation and is feeling good  She is having only minimal exertional dyspnea  As she will continue on Breo 100 mcg 1 puff daily and can use albuterol as her rescue inhaler  She also has a nebulizer at home if needed  She starts having any shortness of breath she will monitor peak flow rates and she can contact our office

## 2018-09-15 ENCOUNTER — HOSPITAL ENCOUNTER (EMERGENCY)
Facility: HOSPITAL | Age: 28
Discharge: HOME | End: 2018-09-15
Attending: EMERGENCY MEDICINE | Admitting: EMERGENCY MEDICINE
Payer: COMMERCIAL

## 2018-09-15 VITALS
SYSTOLIC BLOOD PRESSURE: 130 MMHG | WEIGHT: 120 LBS | BODY MASS INDEX: 20.6 KG/M2 | TEMPERATURE: 96.7 F | RESPIRATION RATE: 20 BRPM | DIASTOLIC BLOOD PRESSURE: 68 MMHG | HEART RATE: 74 BPM | OXYGEN SATURATION: 98 %

## 2018-09-15 DIAGNOSIS — J45.901 ASTHMA EXACERBATION: Primary | ICD-10-CM

## 2018-09-15 DIAGNOSIS — J45.31 MILD PERSISTENT ASTHMA WITH ACUTE EXACERBATION: ICD-10-CM

## 2018-09-15 PROCEDURE — 94640 AIRWAY INHALATION TREATMENT: CPT

## 2018-09-15 PROCEDURE — 99285 EMERGENCY DEPT VISIT HI MDM: CPT

## 2018-09-15 PROCEDURE — 96374 THER/PROPH/DIAG INJ IV PUSH: CPT

## 2018-09-15 RX ORDER — PREDNISONE 10 MG/1
TABLET ORAL
Qty: 20 TABLET | Refills: 0 | Status: SHIPPED | OUTPATIENT
Start: 2018-09-15 | End: 2021-07-12

## 2018-09-15 RX ORDER — IPRATROPIUM BROMIDE AND ALBUTEROL SULFATE 2.5; .5 MG/3ML; MG/3ML
3 SOLUTION RESPIRATORY (INHALATION) ONCE
Status: COMPLETED | OUTPATIENT
Start: 2018-09-15 | End: 2018-09-15

## 2018-09-15 RX ORDER — ALBUTEROL SULFATE 90 UG/1
2 AEROSOL, METERED RESPIRATORY (INHALATION) ONCE
Status: COMPLETED | OUTPATIENT
Start: 2018-09-15 | End: 2018-09-15

## 2018-09-15 RX ORDER — FLUTICASONE FUROATE AND VILANTEROL 100; 25 UG/1; UG/1
1 POWDER RESPIRATORY (INHALATION) DAILY
Qty: 1 INHALER | Refills: 0 | Status: SHIPPED | OUTPATIENT
Start: 2018-09-15 | End: 2021-07-12

## 2018-09-15 RX ORDER — ALBUTEROL SULFATE 2.5 MG/3ML
5 SOLUTION RESPIRATORY (INHALATION) ONCE
Status: COMPLETED | OUTPATIENT
Start: 2018-09-15 | End: 2018-09-15

## 2018-09-15 RX ORDER — METHYLPREDNISOLONE SODIUM SUCCINATE 125 MG/2ML
80 INJECTION, POWDER, LYOPHILIZED, FOR SOLUTION INTRAMUSCULAR; INTRAVENOUS ONCE
Status: COMPLETED | OUTPATIENT
Start: 2018-09-15 | End: 2018-09-15

## 2018-09-15 RX ADMIN — METHYLPREDNISOLONE SODIUM SUCCINATE 80 MG: 125 INJECTION, POWDER, FOR SOLUTION INTRAMUSCULAR; INTRAVENOUS at 21:03

## 2018-09-15 RX ADMIN — IPRATROPIUM BROMIDE 0.5 MG: 0.5 SOLUTION RESPIRATORY (INHALATION) at 19:41

## 2018-09-15 RX ADMIN — ALBUTEROL SULFATE 2 PUFF: 90 AEROSOL, METERED RESPIRATORY (INHALATION) at 21:23

## 2018-09-15 RX ADMIN — ALBUTEROL SULFATE 5 MG: 2.5 SOLUTION RESPIRATORY (INHALATION) at 19:40

## 2018-09-15 RX ADMIN — IPRATROPIUM BROMIDE AND ALBUTEROL SULFATE 3 ML: .5; 3 SOLUTION RESPIRATORY (INHALATION) at 21:03

## 2018-09-16 NOTE — DISCHARGE INSTRUCTIONS
Asthma, Ambulatory Care   GENERAL INFORMATION:   Asthma  is a lung disease that makes breathing difficult  Chronic inflammation and reactions to triggers narrow the airways in your lungs  Asthma can become life-threatening if it is not managed  Common symptoms include the following:   · Coughing     · Wheezing     · Shortness of breath     · Chest tightness  Seek immediate care for the following symptoms:   · Severe shortness of breath    · Blue or gray lips or nails    · Skin around your neck and ribs pulls in with each breath    · Shortness of breath, even after you take your short-term medicine as directed     · Peak flow numbers in the red zone of your asthma action plan  Treatment for asthma  will depend on how severe it is  Medicine may decrease inflammation, open airways, and make it easier to breathe  Medicines may be inhaled, taken as a pill, or injected  Short-term medicines relieve your symptoms quickly  Long-term medicines are used to prevent future attacks  You may also need medicine to help control your allergies  Manage and prevent future asthma attacks:   · Follow your asthma action pan  This is a written plan that you and your healthcare provider create  It explains which medicine you need and when to change doses if necessary  It also explains how you can monitor symptoms and use a peak flow meter  The meter measures how well your lungs are working  · Manage other health conditions , such as allergies, acid reflux, and sleep apnea  · Identify and avoid triggers  These may include pets, dust mites, mold, and cockroaches  · Do not smoke and avoid others who smoke  If you smoke, it is never too late to quit  Ask your healthcare provider if you need help quitting  · Ask about a flu vaccine  The flu can make your asthma worse  You may need a yearly flu shot  Follow up with your healthcare provider as directed:   You will need to return to make sure your medicine is working and your symptoms are controlled  You may be referred to an asthma or allergy specialist  Elva Ray may be asked to keep a record of your peak flow values and bring it with you to your appointments  Write down your questions so you remember to ask them during your visits  CARE AGREEMENT:   You have the right to help plan your care  Learn about your health condition and how it may be treated  Discuss treatment options with your caregivers to decide what care you want to receive  You always have the right to refuse treatment  The above information is an  only  It is not intended as medical advice for individual conditions or treatments  Talk to your doctor, nurse or pharmacist before following any medical regimen to see if it is safe and effective for you  © 2014 [address] is for End User's use only and may not be sold, redistributed or otherwise used for commercial purposes  All illustrations and images included in CareNotes® are the copyrighted property of [employer] D [employer]  or Emmanuel Simpson

## 2018-09-16 NOTE — ED PROVIDER NOTES
History  Chief Complaint   Patient presents with    Asthma     States asthma has been bad for a few days, today " bad", ran out of inhalers, cannot refill until few days   Respiratory Distress     Difficulty talking on arrival, decreased breath sounds, work of breathing , exp wheezes     33 yowf c/o running out of meds a few days ago and asthma has been getting worse since  Needs refill on Breo and albuterol inhaler  No fever  Mild cough  No vomiting or diarrhea  Not a smoker  Pt  Starting to feel better after first duoneb and pulse ox much improved  History provided by:  Patient   used: No    Asthma   Associated symptoms: shortness of breath and wheezing    Associated symptoms: no abdominal pain, no chest pain, no cough, no diarrhea, no fever, no headaches, no myalgias, no nausea, no rash and no vomiting        Prior to Admission Medications   Prescriptions Last Dose Informant Patient Reported? Taking? albuterol (PROVENTIL HFA,VENTOLIN HFA) 90 mcg/act inhaler 9/15/2018 at Unknown time Self No Yes   Sig: Inhale 2 puffs every 4 (four) hours as needed for wheezing or shortness of breath   fluticasone-vilanterol (BREO ELLIPTA) 100-25 mcg/inh inhaler Past Month at Unknown time Self No Yes   Sig: Inhale 1 puff daily Rinse mouth after use    montelukast (SINGULAIR) 10 mg tablet 9/14/2018 at Unknown time Self Yes Yes   Sig: Take 10 mg by mouth daily at bedtime      Facility-Administered Medications Last Administration Doses Remaining   albuterol inhalation solution 2 5 mg 7/17/2018  4:10 PM           Past Medical History:   Diagnosis Date    Asthma        History reviewed  No pertinent surgical history  Family History   Problem Relation Age of Onset    Heart disease Father     Asthma Brother     Cancer Maternal Grandmother     Diabetes Maternal Grandmother      I have reviewed and agree with the history as documented      Social History   Substance Use Topics    Smoking status: Former Smoker     Packs/day: 0 50     Years: 5 00     Types: Cigarettes     Quit date: 01/2018    Smokeless tobacco: Never Used    Alcohol use No        Review of Systems   Constitutional: Negative  Negative for fever  HENT: Negative  Eyes: Negative  Respiratory: Positive for shortness of breath and wheezing  Negative for cough  Cardiovascular: Negative  Negative for chest pain  Gastrointestinal: Negative  Negative for abdominal pain, diarrhea, nausea and vomiting  Genitourinary: Negative  Negative for dysuria and flank pain  Musculoskeletal: Negative  Negative for back pain and myalgias  Skin: Negative  Negative for rash and wound  Neurological: Negative  Negative for dizziness and headaches  Hematological: Does not bruise/bleed easily  Psychiatric/Behavioral: Negative  All other systems reviewed and are negative  Physical Exam  Physical Exam   Constitutional: She is oriented to person, place, and time  She appears well-developed and well-nourished  No distress  HENT:   Head: Normocephalic and atraumatic  Eyes: Conjunctivae are normal  Pupils are equal, round, and reactive to light  Neck: Normal range of motion  Neck supple  Cardiovascular: Normal rate, regular rhythm and normal heart sounds  No murmur heard  Pulmonary/Chest: Effort normal  No respiratory distress  She has decreased breath sounds  She has wheezes  + forced exp  Wheeze after first duoneb   Abdominal: Soft  Bowel sounds are normal  She exhibits no distension  There is no tenderness  Musculoskeletal: Normal range of motion  She exhibits no edema or deformity  Neurological: She is alert and oriented to person, place, and time  No cranial nerve deficit  She exhibits normal muscle tone  Skin: Skin is warm and dry  No rash noted  She is not diaphoretic  No pallor  Psychiatric: She has a normal mood and affect  Her behavior is normal    Nursing note and vitals reviewed        Vital Signs  ED Triage Vitals [09/15/18 1936]   Temperature Pulse Respirations Blood Pressure SpO2   (!) 96 7 °F (35 9 °C) 93 (!) 28 (!) 157/116 (!) 87 %      Temp Source Heart Rate Source Patient Position - Orthostatic VS BP Location FiO2 (%)   Tympanic Monitor Sitting Left arm --      Pain Score       No Pain           Vitals:    09/15/18 2000 09/15/18 2015 09/15/18 2030 09/15/18 2045   BP: 137/60 120/59 126/68 121/77   Pulse: 86 94 86 80   Patient Position - Orthostatic VS:           Visual Acuity      ED Medications  Medications   albuterol (PROVENTIL HFA,VENTOLIN HFA) inhaler 2 puff (not administered)   ipratropium (ATROVENT) 0 02 % inhalation solution 0 5 mg (0 5 mg Nebulization Given 9/15/18 1941)   albuterol inhalation solution 5 mg (5 mg Nebulization Given 9/15/18 1940)   methylPREDNISolone sodium succinate (Solu-MEDROL) injection 80 mg (80 mg Intravenous Given 9/15/18 2103)   ipratropium-albuterol (DUO-NEB) 0 5-2 5 mg/3 mL inhalation solution 3 mL (3 mL Nebulization Given 9/15/18 2103)       Diagnostic Studies  Results Reviewed     None                 No orders to display              Procedures  Procedures       Phone Contacts  ED Phone Contact    ED Course                               MDM  Number of Diagnoses or Management Options  Asthma exacerbation:   Mild persistent asthma with acute exacerbation:   Diagnosis management comments: Improved  Wants me to write for breo refill because she says she is having trouble getting in touch with her pulmonologist   Yady Dietrich still must follow up outpt      CritCare Time    Disposition  Final diagnoses:   Asthma exacerbation   Mild persistent asthma with acute exacerbation     Time reflects when diagnosis was documented in both MDM as applicable and the Disposition within this note     Time User Action Codes Description Comment    8/23/9604  6:64 PM Loralie Littlejohn A Add [V54 104] Asthma exacerbation     7/01/4549  7:12 PM Loralie Littlejohn A Add [T50 47] Mild persistent asthma with acute exacerbation       ED Disposition     ED Disposition Condition Comment    Discharge  [address] discharge to home/self care  Condition at discharge: Stable        Follow-up Information     Follow up With Specialties Details Why Contact Info    your doctor  Schedule an appointment as soon as possible for a visit            Patient's Medications   Discharge Prescriptions    FLUTICASONE-VILANTEROL (BREO ELLIPTA) 100-25 MCG/INH INHALER    Inhale 1 puff daily Rinse mouth after use  Start Date: 9/15/2018 End Date: --       Order Dose: 1 puff       Quantity: 1 Inhaler    Refills: 0    PREDNISONE 10 MG TABLET    4 po daily x2 days, then 3 po daily x2 days, then 2 po daily x2 days,then 1 po daily x2days       Start Date: 9/15/2018 End Date: --       Order Dose: --       Quantity: 20 tablet    Refills: 0     No discharge procedures on file      ED Provider  Electronically Signed by           Beni Sharma MD  62/91/70 2028

## 2018-09-25 ENCOUNTER — TELEPHONE (OUTPATIENT)
Dept: FAMILY MEDICINE | Age: 28
End: 2018-09-25

## 2018-09-25 DIAGNOSIS — R63.4 WEIGHT LOSS: Primary | ICD-10-CM

## 2018-10-01 ENCOUNTER — OFFICE VISIT (OUTPATIENT)
Dept: PULMONOLOGY | Facility: MEDICAL CENTER | Age: 28
End: 2018-10-01
Payer: COMMERCIAL

## 2018-10-01 VITALS
DIASTOLIC BLOOD PRESSURE: 78 MMHG | HEART RATE: 74 BPM | BODY MASS INDEX: 23.66 KG/M2 | WEIGHT: 142 LBS | SYSTOLIC BLOOD PRESSURE: 118 MMHG | OXYGEN SATURATION: 98 % | HEIGHT: 65 IN | RESPIRATION RATE: 16 BRPM | TEMPERATURE: 97.6 F

## 2018-10-01 DIAGNOSIS — J45.40 MODERATE PERSISTENT ASTHMA WITHOUT COMPLICATION: Primary | ICD-10-CM

## 2018-10-01 DIAGNOSIS — J45.41 MODERATE PERSISTENT ASTHMA WITH EXACERBATION: ICD-10-CM

## 2018-10-01 PROBLEM — J45.31 MILD PERSISTENT ASTHMA WITH ACUTE EXACERBATION: Status: RESOLVED | Noted: 2018-07-21 | Resolved: 2018-10-01

## 2018-10-01 PROCEDURE — 99214 OFFICE O/P EST MOD 30 MIN: CPT | Performed by: NURSE PRACTITIONER

## 2018-10-01 RX ORDER — ALBUTEROL SULFATE 90 UG/1
2 AEROSOL, METERED RESPIRATORY (INHALATION) EVERY 4 HOURS PRN
Qty: 1 INHALER | Refills: 3 | Status: SHIPPED | OUTPATIENT
Start: 2018-10-01 | End: 2019-08-05 | Stop reason: SDUPTHER

## 2018-10-01 RX ORDER — FLUTICASONE FUROATE AND VILANTEROL 200; 25 UG/1; UG/1
1 POWDER RESPIRATORY (INHALATION) DAILY
Qty: 1 INHALER | Refills: 0 | Status: SHIPPED | COMMUNITY
Start: 2018-10-01 | End: 2019-10-08 | Stop reason: ALTCHOICE

## 2018-10-01 RX ORDER — MONTELUKAST SODIUM 10 MG/1
10 TABLET ORAL
Qty: 30 TABLET | Refills: 5 | Status: SHIPPED | OUTPATIENT
Start: 2018-10-01 | End: 2019-10-22 | Stop reason: SDUPTHER

## 2018-10-01 RX ORDER — FLUTICASONE FUROATE AND VILANTEROL 100; 25 UG/1; UG/1
1 POWDER RESPIRATORY (INHALATION) DAILY
Qty: 1 INHALER | Refills: 5 | Status: SHIPPED | OUTPATIENT
Start: 2018-10-01 | End: 2021-07-12

## 2018-10-01 RX ORDER — ALBUTEROL SULFATE 1.25 MG/3ML
1 SOLUTION RESPIRATORY (INHALATION) EVERY 6 HOURS PRN
COMMUNITY
End: 2019-10-08 | Stop reason: SDUPTHER

## 2018-10-01 NOTE — PROGRESS NOTES
Assessment/Plan:     Problem List Items Addressed This Visit        Respiratory    Moderate persistent asthma without complication - Primary     Patricia Vick has moderate persistent asthma  Since her last visit here she has been seen in the ER for asthma exacerbation  Her pulmonary function test done July 17, 2018 revealed near normal lung volumes  Forced vital capacity was 3 05 L or 81% of predicted, FEV1 was 2 41 L or 75% of predicted with obstruction ratio 79%  There was no significant post bronchodilation change  Plan includes the following  Monitoring peak flow  Predicted peak flow should be approximately 420 liters/minute  At her last visit she had peak flow of 300 liters/minute  According to patient her average peak flow is about 350  I have given her sample Breo 200 mcg  In the event that she begins to feel more short of breath or for peak flow is below 280 she can begin Breo 200 mics 1 puff daily and place of her Breo 100 mics 1 puff daily  She will also continue her Singulair  I am also giving her a month 1 month sample of Spiriva Respimat 1 25 mcg  He is to use 2 puffs daily  I would like her to trial the usage if she finds this helpful I will reorder it  I am asking her to follow-up in 4-6 weeks  Complete pulmonary function test will be considered at that time  Did review plan of care with patient           Relevant Medications    albuterol (ACCUNEB) 1 25 MG/3ML nebulizer solution    montelukast (SINGULAIR) 10 mg tablet    fluticasone-vilanterol (BREO ELLIPTA) 100-25 mcg/inh inhaler    tiotropium (SPIRIVA RESPIMAT) 1 25 MCG/ACT AERS inhaler    fluticasone-vilanterol (BREO ELLIPTA) 200-25 MCG/INH inhaler      Other Visit Diagnoses     Moderate persistent asthma with exacerbation        Relevant Medications    albuterol (ACCUNEB) 1 25 MG/3ML nebulizer solution    montelukast (SINGULAIR) 10 mg tablet    fluticasone-vilanterol (BREO ELLIPTA) 100-25 mcg/inh inhaler    tiotropium (SPIRIVA RESPIMAT) 1 25 MCG/ACT AERS inhaler    fluticasone-vilanterol (BREO ELLIPTA) 200-25 MCG/INH inhaler            Return in about 4 weeks (around 10/29/2018)  All questions are answered to the patient's satisfaction and understanding  She verbalizes understanding  She is encouraged to call with any further questions or concerns  Portions of the record may have been created with voice recognition software  Occasional wrong word or "sound a like" substitutions may have occurred due to the inherent limitations of voice recognition software  Read the chart carefully and recognize, using context, where substitutions have occurred  Electronically Signed by BRAYAN Sánchez    ______________________________________________________________________    Chief Complaint:   Chief Complaint   Patient presents with    Follow-up    Asthma    Shortness of Breath     Exertion       Patient ID: Kayla Erazo is a 33 y o  y o  female has a past medical history of Asthma  10/1/2018  Patient presents today for follow-up visit  Kayla Erazo is a 59-year-old female who was last seen in the office July 17, 2018  She had been hospitalized previous to this for asthma exacerbation on July 8 to July 9th  She had pulmonary function test done on that date of visit that it is noted that her FEV1 was minimally reductase it would which could be due to air trapping  She currently is on Breo 100 mics 1 puff daily  Kayla Erazo presented to the ER on September 15, 2018  She had asthma exacerbation and was given short tapering dose of prednisone  She has completed the same and currently is here for follow-up  Asthma   She complains of chest tightness, cough and shortness of breath  This is a recurrent problem  The current episode started more than 1 year ago  The problem occurs daily  The problem has been gradually improving  The cough is non-productive  Associated symptoms include dyspnea on exertion and postnasal drip   Her symptoms are aggravated by exercise and pollen  Her symptoms are alleviated by rest and leukotriene antagonist  She reports moderate improvement on treatment  Her symptoms are not alleviated by leukotriene antagonist and steroid inhaler  Her past medical history is significant for asthma  Shortness of Breath   Her past medical history is significant for asthma  Review of Systems   Constitutional: Negative  HENT: Positive for postnasal drip  Eyes: Negative  Respiratory: Positive for cough and shortness of breath  Cardiovascular: Positive for dyspnea on exertion  Gastrointestinal: Negative  Endocrine: Negative  Genitourinary: Negative  Musculoskeletal: Negative  Skin: Negative  Allergic/Immunologic: Negative  Neurological: Negative  Hematological: Negative  Psychiatric/Behavioral: Negative  Smoking history: She reports that she quit smoking about 8 months ago  Her smoking use included Cigarettes  She has a 2 50 pack-year smoking history  She has never used smokeless tobacco     The following portions of the patient's history were reviewed and updated as appropriate: allergies, current medications, past family history, past medical history, past social history, past surgical history and problem list     Immunization History   Administered Date(s) Administered    Pneumococcal Polysaccharide PPV23 12/21/2016    Tdap 11/21/2016     Current Outpatient Prescriptions   Medication Sig Dispense Refill    albuterol (ACCUNEB) 1 25 MG/3ML nebulizer solution Take 1 ampule by nebulization every 6 (six) hours as needed for wheezing      albuterol (PROVENTIL HFA,VENTOLIN HFA) 90 mcg/act inhaler Inhale 2 puffs every 4 (four) hours as needed for wheezing or shortness of breath 1 Inhaler 0    fluticasone-vilanterol (BREO ELLIPTA) 100-25 mcg/inh inhaler Inhale 1 puff daily Rinse mouth after use   1 Inhaler 5    montelukast (SINGULAIR) 10 mg tablet Take 1 tablet (10 mg total) by mouth daily at bedtime 30 tablet 5    fluticasone-vilanterol (BREO ELLIPTA) 100-25 mcg/inh inhaler Inhale 1 puff daily Rinse mouth after use  (Patient not taking: Reported on 10/1/2018 ) 1 Inhaler 0    fluticasone-vilanterol (BREO ELLIPTA) 200-25 MCG/INH inhaler Inhale 1 puff daily Rinse mouth after use  1 Inhaler 0    predniSONE 10 mg tablet 4 po daily x2 days, then 3 po daily x2 days, then 2 po daily x2 days,then 1 po daily x2days (Patient not taking: Reported on 10/1/2018 ) 20 tablet 0    tiotropium (SPIRIVA RESPIMAT) 1 25 MCG/ACT AERS inhaler Inhale 2 puffs daily 1 Inhaler 5     Current Facility-Administered Medications   Medication Dose Route Frequency Provider Last Rate Last Dose    albuterol inhalation solution 2 5 mg  2 5 mg Nebulization Q6H PRN Prince Luis Miguel DO   2 5 mg at 07/17/18 1610     Allergies: Patient has no known allergies  Objective:  Vitals:    10/01/18 1650   BP: 118/78   BP Location: Left arm   Patient Position: Sitting   Cuff Size: Standard   Pulse: 74   Resp: 16   Temp: 97 6 °F (36 4 °C)   TempSrc: Tympanic   SpO2: 98%   Weight: 64 4 kg (142 lb)   Height: 5' 4 5" (1 638 m)   Oxygen Therapy  SpO2: 98 %    Wt Readings from Last 3 Encounters:   10/01/18 64 4 kg (142 lb)   09/15/18 54 4 kg (120 lb)   07/17/18 58 1 kg (128 lb)     Body mass index is 24 kg/m²  Physical Exam   Constitutional: She appears well-developed and well-nourished  HENT:   Head: Normocephalic and atraumatic  Mallampati 1   Eyes: Pupils are equal, round, and reactive to light  Conjunctivae are normal    Neck: Normal range of motion  Cardiovascular: Normal rate and regular rhythm  Pulmonary/Chest: Effort normal  She has wheezes  Pain bilateral expiratory wheezing   Abdominal: Soft  Musculoskeletal: Normal range of motion  Neurological: She is alert  Skin: Skin is warm and dry  Psychiatric: She has a normal mood and affect   Her behavior is normal  Thought content normal        Lab Review:   Admission on 07/08/2018, Discharged on 07/09/2018   Component Date Value    Sodium 07/08/2018 140     Potassium 07/08/2018 4 0     Chloride 07/08/2018 104     CO2 07/08/2018 31     ANION GAP 07/08/2018 5     BUN 07/08/2018 5     Creatinine 07/08/2018 0 89     Glucose 07/08/2018 105     Calcium 07/08/2018 8 8     eGFR 07/08/2018 88     WBC 07/08/2018 6 57     RBC 07/08/2018 5 16*    Hemoglobin 07/08/2018 14 2     Hematocrit 07/08/2018 44 7     MCV 07/08/2018 87     MCH 07/08/2018 27 5     MCHC 07/08/2018 31 8     RDW 07/08/2018 12 9     MPV 07/08/2018 10 1     Platelets 79/39/9294 227     nRBC 07/08/2018 0     Neutrophils Relative 07/08/2018 30*    Immat GRANS % 07/08/2018 0     Lymphocytes Relative 07/08/2018 36     Monocytes Relative 07/08/2018 7     Eosinophils Relative 07/08/2018 26*    Basophils Relative 07/08/2018 1     Neutrophils Absolute 07/08/2018 2 00     Immature Grans Absolute 07/08/2018 0 01     Lymphocytes Absolute 07/08/2018 2 33     Monocytes Absolute 07/08/2018 0 47     Eosinophils Absolute 07/08/2018 1 71*    Basophils Absolute 07/08/2018 0 05     MRSA Culture Only 07/08/2018 No Methicillin Resistant Staphlyococcus aureus (MRSA) isolated     Sodium 07/09/2018 139     Potassium 07/09/2018 4 2     Chloride 07/09/2018 104     CO2 07/09/2018 25     ANION GAP 07/09/2018 10     BUN 07/09/2018 7     Creatinine 07/09/2018 0 90     Glucose 07/09/2018 112     Glucose, Fasting 07/09/2018 112*    Calcium 07/09/2018 9 2     eGFR 07/09/2018 87     WBC 07/09/2018 7 19     RBC 07/09/2018 5 23*    Hemoglobin 07/09/2018 14 4     Hematocrit 07/09/2018 44 6     MCV 07/09/2018 85     MCH 07/09/2018 27 5     MCHC 07/09/2018 32 3     RDW 07/09/2018 12 7     MPV 07/09/2018 10 1     Platelets 43/98/4735 264     nRBC 07/09/2018 0     Neutrophils Relative 07/09/2018 74     Immat GRANS % 07/09/2018 0     Lymphocytes Relative 07/09/2018 20     Monocytes Relative 07/09/2018 6     Eosinophils Relative 07/09/2018 0     Basophils Relative 07/09/2018 0     Neutrophils Absolute 07/09/2018 5 26     Immature Grans Absolute 07/09/2018 0 01     Lymphocytes Absolute 07/09/2018 1 47     Monocytes Absolute 07/09/2018 0 40     Eosinophils Absolute 07/09/2018 0 03     Basophils Absolute 07/09/2018 0 02     Ventricular Rate 07/08/2018 115     Atrial Rate 07/08/2018 115     MA Interval 07/08/2018 146     QRSD Interval 07/08/2018 78     QT Interval 07/08/2018 290     QTC Interval 07/08/2018 401     P Axis 07/08/2018 76     QRS Axis 07/08/2018 80     T Wave Axis 07/08/2018 -53    Admission on 04/21/2018, Discharged on 04/21/2018   Component Date Value    WBC 04/21/2018 7 25     RBC 04/21/2018 5 63*    Hemoglobin 04/21/2018 15 7*    Hematocrit 04/21/2018 46 1     MCV 04/21/2018 82     MCH 04/21/2018 27 9     MCHC 04/21/2018 34 1     RDW 04/21/2018 13 3     MPV 04/21/2018 10 6     Platelets 52/49/5219 313     Neutrophils Relative 04/21/2018 55     Lymphocytes Relative 04/21/2018 19     Monocytes Relative 04/21/2018 7     Eosinophils Relative 04/21/2018 18*    Basophils Relative 04/21/2018 1     Neutrophils Absolute 04/21/2018 3 97     Lymphocytes Absolute 04/21/2018 1 38     Monocytes Absolute 04/21/2018 0 54     Eosinophils Absolute 04/21/2018 1 32*    Basophils Absolute 04/21/2018 0 04     Sodium 04/21/2018 139     Potassium 04/21/2018 4 0     Chloride 04/21/2018 102     CO2 04/21/2018 27     ANION GAP 04/21/2018 10     BUN 04/21/2018 7     Creatinine 04/21/2018 0 94     Glucose 04/21/2018 93     Calcium 04/21/2018 9 4     AST 04/21/2018 17     ALT 04/21/2018 25     Alkaline Phosphatase 04/21/2018 90     Total Protein 04/21/2018 8 0     Albumin 04/21/2018 4 0     Total Bilirubin 04/21/2018 0 40     eGFR 04/21/2018 83     EXT PREG TEST UR (Ref: N* 04/21/2018 negative     Color, UA 04/21/2018 Yellow     Clarity, UA 04/21/2018 Slightly Cloudy     pH, UA 04/21/2018 6 0  Leukocytes, UA 04/21/2018 Small*    Nitrite, UA 04/21/2018 Negative     Protein, UA 04/21/2018 Negative     Glucose, UA 04/21/2018 Negative     Ketones, UA 04/21/2018 15 (1+)*    Urobilinogen, UA 04/21/2018 0 2     Bilirubin, UA 04/21/2018 Negative     Blood, UA 04/21/2018 Moderate*    Specific Gravity, UA 04/21/2018 1 015     RBC, UA 04/21/2018 0-1*    WBC, UA 04/21/2018 4-10*    Epithelial Cells 04/21/2018 Innumerable*    Bacteria, UA 04/21/2018 Moderate*    Ventricular Rate 04/21/2018 87     Atrial Rate 04/21/2018 87     LA Interval 04/21/2018 108     QRSD Interval 04/21/2018 72     QT Interval 04/21/2018 332     QTC Interval 04/21/2018 399     P Axis 04/21/2018 72     QRS Axis 04/21/2018 84     T Wave Axis 04/21/2018 -28        Diagnostics:  I have personally reviewed pertinent reports  Office Spirometry Results:     ESS:    No results found

## 2018-10-01 NOTE — PATIENT INSTRUCTIONS
I have changed her regimen  We will now be on Breo 200 mics 1 puff daily for the next 2 weeks  I have also given you samples of Spiriva Respimat  U will use 2 puffs daily in addition to the [facility]  You can reduce her Breo to 100 mcg  1 puff daily  Continue year Singulair  Monitor year peak flow and follow-up in 4-6 weeks

## 2018-10-01 NOTE — ASSESSMENT & PLAN NOTE
[facility] has moderate persistent asthma  Since her last visit here she has been seen in the ER for asthma exacerbation  Her pulmonary function test done July 17, 2018 revealed near normal lung volumes  Forced vital capacity was 3 05 L or 81% of predicted, FEV1 was 2 41 L or 75% of predicted with obstruction ratio 79%  There was no significant post bronchodilation change  Plan includes the following  Monitoring peak flow  Predicted peak flow should be approximately 420 liters/minute  At her last visit she had peak flow of 300 liters/minute  According to patient her average peak flow is about 350  I have given her sample Breo 200 mcg  In the event that she begins to feel more short of breath or for peak flow is below 280 she can begin Breo 200 mics 1 puff daily and place of her Breo 100 mics 1 puff daily  She will also continue her Singulair  I am also giving her a month 1 month sample of Spiriva Respimat 1 25 mcg  He is to use 2 puffs daily  I would like her to trial the usage if she finds this helpful I will reorder it  I am asking her to follow-up in 4-6 weeks  Complete pulmonary function test will be considered at that time  Did review plan of care with patient

## 2018-10-04 ENCOUNTER — TELEPHONE (OUTPATIENT)
Dept: FAMILY MEDICINE | Age: 28
End: 2018-10-04

## 2019-05-14 DIAGNOSIS — J45.40 MODERATE PERSISTENT ASTHMA WITHOUT COMPLICATION: Primary | ICD-10-CM

## 2019-05-14 RX ORDER — FLUTICASONE FUROATE AND VILANTEROL TRIFENATATE 100; 25 UG/1; UG/1
POWDER RESPIRATORY (INHALATION)
Qty: 1 INHALER | Refills: 5 | Status: SHIPPED | OUTPATIENT
Start: 2019-05-14 | End: 2019-11-14 | Stop reason: SDUPTHER

## 2019-08-05 DIAGNOSIS — J45.41 MODERATE PERSISTENT ASTHMA WITH EXACERBATION: ICD-10-CM

## 2019-08-05 RX ORDER — ALBUTEROL SULFATE 90 UG/1
2 AEROSOL, METERED RESPIRATORY (INHALATION) EVERY 4 HOURS PRN
Qty: 1 INHALER | Refills: 3 | Status: SHIPPED | OUTPATIENT
Start: 2019-08-05 | End: 2019-11-18 | Stop reason: SDUPTHER

## 2019-10-08 ENCOUNTER — OFFICE VISIT (OUTPATIENT)
Dept: PULMONOLOGY | Facility: MEDICAL CENTER | Age: 29
End: 2019-10-08
Payer: COMMERCIAL

## 2019-10-08 VITALS
TEMPERATURE: 98.7 F | DIASTOLIC BLOOD PRESSURE: 78 MMHG | WEIGHT: 161 LBS | BODY MASS INDEX: 27.49 KG/M2 | SYSTOLIC BLOOD PRESSURE: 122 MMHG | HEIGHT: 64 IN | OXYGEN SATURATION: 98 % | RESPIRATION RATE: 12 BRPM | HEART RATE: 87 BPM

## 2019-10-08 DIAGNOSIS — J45.40 MODERATE PERSISTENT ASTHMA WITHOUT COMPLICATION: Primary | ICD-10-CM

## 2019-10-08 DIAGNOSIS — R06.02 SHORTNESS OF BREATH: ICD-10-CM

## 2019-10-08 PROCEDURE — 94640 AIRWAY INHALATION TREATMENT: CPT | Performed by: NURSE PRACTITIONER

## 2019-10-08 PROCEDURE — 94010 BREATHING CAPACITY TEST: CPT | Performed by: NURSE PRACTITIONER

## 2019-10-08 PROCEDURE — 99214 OFFICE O/P EST MOD 30 MIN: CPT | Performed by: NURSE PRACTITIONER

## 2019-10-08 RX ORDER — IPRATROPIUM BROMIDE AND ALBUTEROL SULFATE 2.5; .5 MG/3ML; MG/3ML
3 SOLUTION RESPIRATORY (INHALATION) ONCE
Status: COMPLETED | OUTPATIENT
Start: 2019-10-08 | End: 2019-10-08

## 2019-10-08 RX ORDER — IPRATROPIUM BROMIDE AND ALBUTEROL SULFATE 2.5; .5 MG/3ML; MG/3ML
3 SOLUTION RESPIRATORY (INHALATION) 2 TIMES DAILY
Qty: 180 ML | Refills: 3 | Status: SHIPPED | OUTPATIENT
Start: 2019-10-08 | End: 2019-11-07

## 2019-10-08 RX ORDER — ALBUTEROL SULFATE 1.25 MG/3ML
1 SOLUTION RESPIRATORY (INHALATION) EVERY 6 HOURS PRN
Qty: 90 ML | Refills: 3 | Status: SHIPPED | OUTPATIENT
Start: 2019-10-08 | End: 2020-07-23 | Stop reason: SDUPTHER

## 2019-10-08 RX ADMIN — IPRATROPIUM BROMIDE AND ALBUTEROL SULFATE 3 ML: 2.5; .5 SOLUTION RESPIRATORY (INHALATION) at 15:27

## 2019-10-08 NOTE — PATIENT INSTRUCTIONS
Asthma   WHAT YOU NEED TO KNOW:   Asthma is a lung disease that makes breathing difficult  Chronic inflammation and reactions to triggers narrow the airways in the lungs  Asthma can become life-threatening if it is not managed  DISCHARGE INSTRUCTIONS:   Return to the emergency department if:   · You have severe shortness of breath  · Your lips or nails turn blue or gray  · The skin around your neck and ribs pulls in with each breath  · You have shortness of breath, even after you take your short-term medicine as directed  · Your peak flow numbers are in the red zone of your AAP  Contact your healthcare provider if:   · You run out of medicine before your next refill is due  · Your symptoms get worse  · You need to take more medicine than usual to control your symptoms  · You have questions or concerns about your condition or care  Medicines:   · Medicines  decrease inflammation, open airways, and make it easier to breathe  Medicines may be inhaled, taken as a pill, or injected  Short-term medicines relieve your symptoms quickly  Long-term medicines are used to prevent future attacks  You may also need medicine to help control your allergies  Ask your healthcare provider for more information about the medicine you are given and how to take it safely  · Take your medicine as directed  Contact your healthcare provider if you think your medicine is not helping or if you have side effects  Tell him of her if you are allergic to any medicine  Keep a list of the medicines, vitamins, and herbs you take  Include the amounts, and when and why you take them  Bring the list or the pill bottles to follow-up visits  Carry your medicine list with you in case of an emergency  Follow up with your healthcare provider as directed: You will need to return to make sure your medicine is working and your symptoms are controlled   You may be referred to an asthma specialist  Reed Jacinto may be asked to keep a record of your peak flow values and bring it with you to your appointments  Write down your questions so you remember to ask them during your visits  Manage your symptoms and prevent future attacks:   · Follow your Asthma Action Plan (AAP)  This is a written plan that you and your healthcare provider create  It explains which medicine you need and when to change doses if necessary  It also explains how you can monitor symptoms and use a peak flow meter  The meter measures how well your lungs are working  · Manage other health conditions , such as allergies, acid reflux, and sleep apnea  · Identify and avoid triggers  These may include pets, dust mites, mold, and cockroaches  · Do not smoke or be around others who smoke  Nicotine and other chemicals in cigarettes and cigars can cause lung damage  Ask your healthcare provider for information if you currently smoke and need help to quit  E-cigarettes or smokeless tobacco still contain nicotine  Talk to your healthcare provider before you use these products  · Ask about the flu vaccine  The flu can make your asthma worse  You may need a yearly flu shot  © 2017 [facility] Information is for End User's use only and may not be sold, redistributed or otherwise used for commercial purposes  All illustrations and images included in CareNotes® are the copyrighted property of [employer] D [employer] M [employer]  or [employer]  The above information is an  only  It is not intended as medical advice for individual conditions or treatments  Talk to your doctor, nurse or pharmacist before following any medical regimen to see if it is safe and effective for you  I have ordered for you a new medication for year nebulizer caught DuoNeb  This is a combination of albuterol and ipratropium  You can uses on an as needed basis

## 2019-10-08 NOTE — ASSESSMENT & PLAN NOTE
Patient has mild to moderate asthma  She has been doing well overall  Her current regimen includes Breo 100 mcg 1 puff daily  She also uses montelukast 10 mg daily  She also has a nebulizer with albuterol which she has been using approximately twice weekly  PFTs that were done today show normal spirometry  Lung volumes are very similar to those done in July of 2018  Forced vital capacity is 3 19 L or 84% of predicted, FEV1 2 53 L or 79% of predicted obstruction ratio 70 9%  Post bronchodilation PFT was done as well  Forced vital capacity was 3 34 L or 89% of predicted, FEV1 went from 2 53 L or 79% of predicted to 3 06 L or 96% of predicted and obstruction ratio was 93  Additionally her peak expiratory volume normalized to 420 liters/minute  Her PFTs clearly show reversibility  Patient is stable at this time with current regimen  I encouraged her to use her rescue inhaler as needed  I also renewed her nebulizer medication with albuterol

## 2019-10-08 NOTE — ASSESSMENT & PLAN NOTE
Patient has improvement in her shortness of breath  This is likely due to air trapping secondary to asthma  She has been stable with her mild to moderate asthma  I reviewed PFTs with patient  With use of bronchodilator there is clear improvement in her forced expiratory volume as well as peak flow  I have reordered nebulizer medication  I will give her DuoNeb instead of straight albuterol  Patient agrees with the same

## 2019-10-08 NOTE — PROGRESS NOTES
Assessment/Plan:     Problem List Items Addressed This Visit        Respiratory    Moderate persistent asthma without complication - Primary     Patient has mild to moderate asthma  She has been doing well overall  Her current regimen includes Breo 100 mcg 1 puff daily  She also uses montelukast 10 mg daily  She also has a nebulizer with albuterol which she has been using approximately twice weekly  PFTs that were done today show normal spirometry  Lung volumes are very similar to those done in July of 2018  Forced vital capacity is 3 19 L or 84% of predicted, FEV1 2 53 L or 79% of predicted obstruction ratio 70 9%  Post bronchodilation PFT was done as well  Forced vital capacity was 3 34 L or 89% of predicted, FEV1 went from 2 53 L or 79% of predicted to 3 06 L or 96% of predicted and obstruction ratio was 93  Additionally her peak expiratory volume normalized to 420 liters/minute  Her PFTs clearly show reversibility  Patient is stable at this time with current regimen  I encouraged her to use her rescue inhaler as needed  I also renewed her nebulizer medication with albuterol  Relevant Medications    ipratropium-albuterol (DUO-NEB) 0 5-2 5 mg/3 mL inhalation solution 3 mL (Completed)    albuterol (ACCUNEB) 1 25 MG/3ML nebulizer solution    Other Relevant Orders    POCT spirometry (Completed)            No follow-ups on file  All questions are answered to the patient's satisfaction and understanding  She verbalizes understanding  She is encouraged to call with any further questions or concerns  Portions of the record may have been created with voice recognition software  Occasional wrong word or "sound a like" substitutions may have occurred due to the inherent limitations of voice recognition software  Read the chart carefully and recognize, using context, where substitutions have occurred  HPI:  Cleo is a 24-year-old female who has moderate persistent asthma    Her last pulmonary function test was done July of 2018  Near normal lung volumes were noted  There was a mild restrictive pattern  Forced vital capacity was 3 0 L or 81% of predicted FEV1 was 2 41 L or 75% and obstruction ratio 79%  Was determined that her peak flow should be approximately 420 liters/minute  At her last visit she was status post ER visit  I did give her a month supply of Spiriva Respimat  She remains on Breo 100 mcg 1 puff daily as well as montelukast 10 mg daily she continues to use her rescue inhaler about twice or 3 times a week  Electronically Signed by BRAYAN Mckeon    ______________________________________________________________________    Chief Complaint:   Chief Complaint   Patient presents with    Shortness of Breath     pt states fine  no cough/wheeze       Patient ID: Stefanie Figueroa is a 26 y o  y o  female has a past medical history of Asthma  10/8/2019  Patient presents today for follow-up visit  Shortness of Breath   Associated symptoms include wheezing  Her past medical history is significant for asthma  Wheezing    This is a chronic problem  The current episode started more than 1 year ago  The problem occurs intermittently  The problem has been unchanged  Associated symptoms include shortness of breath  The symptoms are aggravated by exercise  She has tried beta agonist inhalers, leukotriene antagonists, rest and steroid inhaler for the symptoms  The treatment provided mild relief  Her past medical history is significant for asthma  Review of Systems   Constitutional: Negative  HENT: Negative  Eyes: Negative  Respiratory: Positive for shortness of breath and wheezing  Cardiovascular: Negative  Gastrointestinal: Negative  Endocrine: Negative  Genitourinary: Negative  Musculoskeletal: Negative  Skin: Negative  Allergic/Immunologic: Negative  Neurological: Negative  Hematological: Negative  Psychiatric/Behavioral: Negative          Smoking history: She reports that she quit smoking about 21 months ago  Her smoking use included cigarettes  She has a 2 50 pack-year smoking history  She has never used smokeless tobacco     The following portions of the patient's history were reviewed and updated as appropriate: allergies, current medications, past family history, past medical history, past social history, past surgical history and problem list     Immunization History   Administered Date(s) Administered    Pneumococcal Polysaccharide PPV23 12/21/2016    Tdap 11/21/2016     Current Outpatient Medications   Medication Sig Dispense Refill    albuterol (ACCUNEB) 1 25 MG/3ML nebulizer solution Take 3 mL (1 25 mg total) by nebulization every 6 (six) hours as needed for wheezing 90 mL 3    albuterol (PROVENTIL HFA,VENTOLIN HFA) 90 mcg/act inhaler Inhale 2 puffs every 4 (four) hours as needed for wheezing or shortness of breath 1 Inhaler 3    BREO ELLIPTA 100-25 MCG/INH inhaler INHALE ONE PUFF BY MOUTH DAILY, RINSE MOUTH AFTER USE  1 Inhaler 5    hydrOXYzine HCL (ATARAX) 25 mg tablet TAKE 1 TABLET (25 MG TOTAL) BY MOUTH EVERY 6 (SIX) HOURS AS NEEDED FOR ANXIETY  1    montelukast (SINGULAIR) 10 mg tablet Take 1 tablet (10 mg total) by mouth daily at bedtime 30 tablet 5    sertraline (ZOLOFT) 50 mg tablet TAKE 1 5 TABLETS (75 MG TOTAL) BY MOUTH DAILY  1    fluticasone-vilanterol (BREO ELLIPTA) 100-25 mcg/inh inhaler Inhale 1 puff daily Rinse mouth after use  (Patient not taking: Reported on 10/1/2018 ) 1 Inhaler 0    fluticasone-vilanterol (BREO ELLIPTA) 100-25 mcg/inh inhaler Inhale 1 puff daily Rinse mouth after use  (Patient not taking: Reported on 10/8/2019) 1 Inhaler 5    fluticasone-vilanterol (BREO ELLIPTA) 200-25 MCG/INH inhaler Inhale 1 puff daily Rinse mouth after use   (Patient not taking: Reported on 10/8/2019) 1 Inhaler 0    predniSONE 10 mg tablet 4 po daily x2 days, then 3 po daily x2 days, then 2 po daily x2 days,then 1 po daily x2days (Patient not taking: Reported on 10/1/2018 ) 20 tablet 0    tiotropium (SPIRIVA RESPIMAT) 1 25 MCG/ACT AERS inhaler Inhale 2 puffs daily (Patient not taking: Reported on 10/8/2019) 1 Inhaler 5     No current facility-administered medications for this visit  Allergies: Patient has no known allergies  Objective:  Vitals:    10/08/19 1506   BP: 122/78   BP Location: Left arm   Patient Position: Sitting   Cuff Size: Standard   Pulse: 87   Resp: 12   Temp: 98 7 °F (37 1 °C)   TempSrc: Oral   SpO2: 98%   Weight: 73 kg (161 lb)   Height: 5' 4" (1 626 m)   Oxygen Therapy  SpO2: 98 %    Wt Readings from Last 3 Encounters:   10/08/19 73 kg (161 lb)   10/01/18 64 4 kg (142 lb)   09/15/18 54 4 kg (120 lb)     Body mass index is 27 64 kg/m²  Physical Exam   Constitutional: She is oriented to person, place, and time  She appears well-developed and well-nourished  HENT:   Head: Normocephalic and atraumatic  Mallampati 3   Eyes: Pupils are equal, round, and reactive to light  EOM are normal    Cardiovascular: Normal rate and regular rhythm  Pulmonary/Chest: Effort normal and breath sounds normal    Abdominal: Soft  Musculoskeletal: Normal range of motion  Right lower leg: Normal         Left lower leg: Normal    Neurological: She is alert and oriented to person, place, and time  Skin: Skin is warm and dry  Capillary refill takes less than 2 seconds  Psychiatric: She has a normal mood and affect  Her behavior is normal      Mini neb  Performed by: Cristine Crews  Authorized by: BRAYAN Harris     Number of treatments:  1  Treatment 1:   Pre-Procedure     Symptoms:  Shortness of breath    Lung Sounds:  CTA    SP02:  97%    Medication Administered:  Duoneb - Albuterol 2 5 mg/Atrovent 0 5 mg  Post-Procedure     Lung sounds:  Patient had marked improvement in FEV1 status post DuoNeb nebulizer treatment  FEV 1 increased from 2 53 L or 79% of predicted to 3 06 L or 96% of predicted          Lab Review:   No visits with results within 6 Month(s) from this visit     Latest known visit with results is:   Admission on 07/08/2018, Discharged on 07/09/2018   Component Date Value    Sodium 07/08/2018 140     Potassium 07/08/2018 4 0     Chloride 07/08/2018 104     CO2 07/08/2018 31     ANION GAP 07/08/2018 5     BUN 07/08/2018 5     Creatinine 07/08/2018 0 89     Glucose 07/08/2018 105     Calcium 07/08/2018 8 8     eGFR 07/08/2018 88     WBC 07/08/2018 6 57     RBC 07/08/2018 5 16*    Hemoglobin 07/08/2018 14 2     Hematocrit 07/08/2018 44 7     MCV 07/08/2018 87     MCH 07/08/2018 27 5     MCHC 07/08/2018 31 8     RDW 07/08/2018 12 9     MPV 07/08/2018 10 1     Platelets 24/74/0761 227     nRBC 07/08/2018 0     Neutrophils Relative 07/08/2018 30*    Immat GRANS % 07/08/2018 0     Lymphocytes Relative 07/08/2018 36     Monocytes Relative 07/08/2018 7     Eosinophils Relative 07/08/2018 26*    Basophils Relative 07/08/2018 1     Neutrophils Absolute 07/08/2018 2 00     Immature Grans Absolute 07/08/2018 0 01     Lymphocytes Absolute 07/08/2018 2 33     Monocytes Absolute 07/08/2018 0 47     Eosinophils Absolute 07/08/2018 1 71*    Basophils Absolute 07/08/2018 0 05     MRSA Culture Only 07/08/2018 No Methicillin Resistant Staphlyococcus aureus (MRSA) isolated     Sodium 07/09/2018 139     Potassium 07/09/2018 4 2     Chloride 07/09/2018 104     CO2 07/09/2018 25     ANION GAP 07/09/2018 10     BUN 07/09/2018 7     Creatinine 07/09/2018 0 90     Glucose 07/09/2018 112     Glucose, Fasting 07/09/2018 112*    Calcium 07/09/2018 9 2     eGFR 07/09/2018 87     WBC 07/09/2018 7 19     RBC 07/09/2018 5 23*    Hemoglobin 07/09/2018 14 4     Hematocrit 07/09/2018 44 6     MCV 07/09/2018 85     MCH 07/09/2018 27 5     MCHC 07/09/2018 32 3     RDW 07/09/2018 12 7     MPV 07/09/2018 10 1     Platelets 69/28/1057 264     nRBC 07/09/2018 0     Neutrophils Relative 07/09/2018 74     Immat GRANS % 07/09/2018 0     Lymphocytes Relative 07/09/2018 20     Monocytes Relative 07/09/2018 6     Eosinophils Relative 07/09/2018 0     Basophils Relative 07/09/2018 0     Neutrophils Absolute 07/09/2018 5 26     Immature Grans Absolute 07/09/2018 0 01     Lymphocytes Absolute 07/09/2018 1 47     Monocytes Absolute 07/09/2018 0 40     Eosinophils Absolute 07/09/2018 0 03     Basophils Absolute 07/09/2018 0 02     Ventricular Rate 07/08/2018 115     Atrial Rate 07/08/2018 115     [place] Interval 07/08/2018 146     QRSD Interval 07/08/2018 78     QT Interval 07/08/2018 290     QTC Interval 07/08/2018 401     P Axis 07/08/2018 76     QRS Axis 07/08/2018 80     T Wave Axis 07/08/2018 -53        Diagnostics:  I have personally reviewed pertinent reports  Office Spirometry Results:  FEV1: 2 53 liters(79%)  FVC: 3 19 liters(84%)  FEV1/FVC: 79 3 %  ESS:    No results found

## 2019-10-22 DIAGNOSIS — J45.41 MODERATE PERSISTENT ASTHMA WITH EXACERBATION: ICD-10-CM

## 2019-10-22 RX ORDER — MONTELUKAST SODIUM 10 MG/1
10 TABLET ORAL
Qty: 30 TABLET | Refills: 5 | Status: SHIPPED | OUTPATIENT
Start: 2019-10-22

## 2019-11-14 DIAGNOSIS — J45.40 MODERATE PERSISTENT ASTHMA WITHOUT COMPLICATION: ICD-10-CM

## 2019-11-18 DIAGNOSIS — J45.41 MODERATE PERSISTENT ASTHMA WITH EXACERBATION: ICD-10-CM

## 2019-11-18 RX ORDER — FLUTICASONE FUROATE AND VILANTEROL TRIFENATATE 100; 25 UG/1; UG/1
POWDER RESPIRATORY (INHALATION)
Qty: 1 INHALER | Refills: 5 | Status: SHIPPED | OUTPATIENT
Start: 2019-11-18 | End: 2021-07-12

## 2019-11-18 RX ORDER — ALBUTEROL SULFATE 90 UG/1
2 AEROSOL, METERED RESPIRATORY (INHALATION) EVERY 4 HOURS PRN
Qty: 18 INHALER | Refills: 3 | Status: SHIPPED | OUTPATIENT
Start: 2019-11-18 | End: 2020-05-28 | Stop reason: SDUPTHER

## 2020-02-07 NOTE — DISCHARGE INSTRUCTIONS
Asthma   WHAT YOU NEED TO KNOW:   Asthma is a lung disease that makes breathing difficult  Chronic inflammation and reactions to triggers narrow the airways in the lungs  Asthma can become life-threatening if it is not managed  DISCHARGE INSTRUCTIONS:   Return to the emergency department if:   · You have severe shortness of breath  · Your lips or nails turn blue or gray  · The skin around your neck and ribs pulls in with each breath  · You have shortness of breath, even after you take your short-term medicine as directed  · Your peak flow numbers are in the red zone of your AAP  Contact your healthcare provider if:   · You run out of medicine before your next refill is due  · Your symptoms get worse  · You need to take more medicine than usual to control your symptoms  · You have questions or concerns about your condition or care  Medicines:   · Medicines  decrease inflammation, open airways, and make it easier to breathe  Medicines may be inhaled, taken as a pill, or injected  Short-term medicines relieve your symptoms quickly  Long-term medicines are used to prevent future attacks  You may also need medicine to help control your allergies  Ask your healthcare provider for more information about the medicine you are given and how to take it safely  · Take your medicine as directed  Contact your healthcare provider if you think your medicine is not helping or if you have side effects  Tell him of her if you are allergic to any medicine  Keep a list of the medicines, vitamins, and herbs you take  Include the amounts, and when and why you take them  Bring the list or the pill bottles to follow-up visits  Carry your medicine list with you in case of an emergency  Follow up with your healthcare provider as directed: You will need to return to make sure your medicine is working and your symptoms are controlled   You may be referred to an asthma specialist  Elva Ray may be asked to keep a record of your peak flow values and bring it with you to your appointments  Write down your questions so you remember to ask them during your visits  Manage your symptoms and prevent future attacks:   · Follow your Asthma Action Plan (AAP)  This is a written plan that you and your healthcare provider create  It explains which medicine you need and when to change doses if necessary  It also explains how you can monitor symptoms and use a peak flow meter  The meter measures how well your lungs are working  · Manage other health conditions , such as allergies, acid reflux, and sleep apnea  · Identify and avoid triggers  These may include pets, dust mites, mold, and cockroaches  · Do not smoke or be around others who smoke  Nicotine and other chemicals in cigarettes and cigars can cause lung damage  Ask your healthcare provider for information if you currently smoke and need help to quit  E-cigarettes or smokeless tobacco still contain nicotine  Talk to your healthcare provider before you use these products  · Ask about the flu vaccine  The flu can make your asthma worse  You may need a yearly flu shot  © 2017 [facility] [facility] Information is for End User's use only and may not be sold, redistributed or otherwise used for commercial purposes  All illustrations and images included in CareNotes® are the copyrighted property of A D A M , Inc  or Emmanuel Simpson  The above information is an  only  It is not intended as medical advice for individual conditions or treatments  Talk to your doctor, nurse or pharmacist before following any medical regimen to see if it is safe and effective for you  normal...

## 2020-04-03 ENCOUNTER — TELEPHONE (OUTPATIENT)
Dept: OTHER | Facility: OTHER | Age: 30
End: 2020-04-03

## 2020-04-04 DIAGNOSIS — Z20.828 EXPOSURE TO SARS-ASSOCIATED CORONAVIRUS: Primary | ICD-10-CM

## 2020-04-04 DIAGNOSIS — Z20.828 EXPOSURE TO SARS-ASSOCIATED CORONAVIRUS: ICD-10-CM

## 2020-04-04 PROCEDURE — 87635 SARS-COV-2 COVID-19 AMP PRB: CPT

## 2020-04-06 LAB — SARS-COV-2 RNA SPEC QL NAA+PROBE: NOT DETECTED

## 2020-04-20 ENCOUNTER — TELEPHONE (OUTPATIENT)
Dept: PULMONOLOGY | Facility: MEDICAL CENTER | Age: 30
End: 2020-04-20

## 2020-05-24 DIAGNOSIS — J45.41 MODERATE PERSISTENT ASTHMA WITH EXACERBATION: ICD-10-CM

## 2020-05-28 DIAGNOSIS — J45.41 MODERATE PERSISTENT ASTHMA WITH EXACERBATION: ICD-10-CM

## 2020-05-28 RX ORDER — ALBUTEROL SULFATE 90 UG/1
2 AEROSOL, METERED RESPIRATORY (INHALATION) EVERY 4 HOURS PRN
Qty: 18 INHALER | Refills: 3 | Status: SHIPPED | OUTPATIENT
Start: 2020-05-28 | End: 2020-07-23 | Stop reason: SDUPTHER

## 2020-06-03 RX ORDER — ALBUTEROL SULFATE 90 UG/1
2 AEROSOL, METERED RESPIRATORY (INHALATION) EVERY 4 HOURS PRN
Qty: 18 INHALER | Refills: 3 | Status: ON HOLD | OUTPATIENT
Start: 2020-06-03 | End: 2022-06-29 | Stop reason: SDUPTHER

## 2020-07-23 ENCOUNTER — OFFICE VISIT (OUTPATIENT)
Dept: URGENT CARE | Facility: CLINIC | Age: 30
End: 2020-07-23
Payer: COMMERCIAL

## 2020-07-23 VITALS
OXYGEN SATURATION: 96 % | DIASTOLIC BLOOD PRESSURE: 84 MMHG | HEART RATE: 93 BPM | RESPIRATION RATE: 18 BRPM | TEMPERATURE: 97.5 F | SYSTOLIC BLOOD PRESSURE: 123 MMHG

## 2020-07-23 DIAGNOSIS — J45.41 MODERATE PERSISTENT ASTHMA WITH EXACERBATION: ICD-10-CM

## 2020-07-23 DIAGNOSIS — J45.41 MODERATE PERSISTENT ASTHMA WITH ACUTE EXACERBATION: Primary | ICD-10-CM

## 2020-07-23 DIAGNOSIS — J45.40 MODERATE PERSISTENT ASTHMA WITHOUT COMPLICATION: ICD-10-CM

## 2020-07-23 PROCEDURE — 99213 OFFICE O/P EST LOW 20 MIN: CPT | Performed by: PHYSICIAN ASSISTANT

## 2020-07-23 RX ORDER — ALBUTEROL SULFATE 1.25 MG/3ML
1 SOLUTION RESPIRATORY (INHALATION) EVERY 6 HOURS PRN
Qty: 90 ML | Refills: 3 | Status: SHIPPED | OUTPATIENT
Start: 2020-07-23

## 2020-07-23 RX ORDER — ALBUTEROL SULFATE 90 UG/1
2 AEROSOL, METERED RESPIRATORY (INHALATION) EVERY 4 HOURS PRN
Qty: 18 INHALER | Refills: 3 | Status: SHIPPED | OUTPATIENT
Start: 2020-07-23 | End: 2021-07-12

## 2020-07-23 RX ORDER — PREDNISONE 50 MG/1
50 TABLET ORAL DAILY
Qty: 5 TABLET | Refills: 0 | Status: SHIPPED | OUTPATIENT
Start: 2020-07-23 | End: 2020-07-28

## 2020-07-23 NOTE — PATIENT INSTRUCTIONS
Refills provided for albuterol inhaler and albuterol nebulizer solution-use as directed  The patient states that she does not want a nebulizer treatment here in the office  Prescription sent to the pharmacy for prednisone-use as directed  Follow up with PCP/pulmonologist in 3-5 days  Proceed to  ER if symptoms worsen  Bronchospasm   WHAT YOU NEED TO KNOW:   Bronchospasm is a narrowing of the airway that usually comes and goes  You may be at risk for bronchospasm if you have a chest cold or allergies  You may also be at risk if you are bothered by air pollution, certain medicines, cold, dry air, smoke, or strong odors  Exercise may worsen your symptoms  Bronchospasms may make it hard for you to breathe  DISCHARGE INSTRUCTIONS:   Medicines: You may need any of the following:  · Bronchodilators  help expand your airway for easier breathing  Some of these medicines may help prevent future spasms  · Inhaled steroids  help reduce swelling in your airway and soothe your breathing  These are used for long-term control  · Anticholinergics  help relax and open your airway  · Take your medicine as directed  Contact your healthcare provider if you think your medicine is not helping or if you have side effects  Tell him of her if you are allergic to any medicine  Keep a list of the medicines, vitamins, and herbs you take  Include the amounts, and when and why you take them  Bring the list or the pill bottles to follow-up visits  Carry your medicine list with you in case of an emergency  Follow up with your healthcare provider as directed: You may need more tests to find the cause of your condition  Write down your questions so you remember to ask them during your visits  Self-care:   · Avoid triggers  · Warm up before you exercise  Ask your healthcare provider about the best exercise plan for you  · Try to avoid people who are sick   Ask your healthcare provider if you need a flu or pneumonia vaccine  · Breathe through your nose when you are in cold, dry air or weather  This may help reduce lung irritation by warming the air before it reaches your lungs  Contact your healthcare provider if:   · You have a fever  · You have a cough that will not go away  · Your wheezing worsens  · You have questions or concerns about your condition or care  Return to the emergency department if:   · You cough or spit up blood  · You have trouble breathing  · You have blue fingernails or toenails  · You have chest pain  · You have a fast or uneven heartbeat  © 2017 [address] [facility] [employer]  Information is for End User's use only and may not be sold, redistributed or otherwise used for commercial purposes  All illustrations and images included in CareNotes® are the copyrighted property of A D [employer] M [employer] Inc  or Emmanuel Simpson  The above information is an  only  It is not intended as medical advice for individual conditions or treatments  Talk to your doctor, nurse or pharmacist before following any medical regimen to see if it is safe and effective for you

## 2020-07-23 NOTE — PROGRESS NOTES
[address] [facility]        NAME: Zachery Phelan is a 31 y o  female  : 1990    MRN: [id number]  DATE: 2020  TIME: 9:55 AM    Assessment and Plan   Moderate persistent asthma with acute exacerbation [J45 41]  1  Moderate persistent asthma with acute exacerbation  predniSONE 50 mg tablet   2  Moderate persistent asthma with exacerbation  albuterol (PROVENTIL HFA,VENTOLIN HFA) 90 mcg/act inhaler   3  Moderate persistent asthma without complication  albuterol (ACCUNEB) 1 25 MG/3ML nebulizer solution         Patient Instructions   Refills provided for albuterol inhaler and albuterol nebulizer solution-use as directed  The patient states that she does not want a nebulizer treatment here in the office  Prescription sent to the pharmacy for prednisone-use as directed  Follow up with PCP/pulmonologist in 3-5 days  Proceed to  ER if symptoms worsen  Chief Complaint     Chief Complaint   Patient presents with    Asthma     pt presents with 5 day hx of increased asthma, pt states went through entire rescue inhaler, states feels tightness in chest          History of Present Illness   The patient is a 78-year-old female who presents with shortness of breath and wheezing that has been present for 5 days  She does have a history of asthma and follows regularly with a pulmonologist   She states that over the past week she has had increased shortness of breath, wheezing and chest tightness  She has been using her rescue inhaler several times daily with only short-term relief  She denies a fever or chills  Negative nasal or sinus congestion  Negative abdominal pain, nausea, vomiting or diarrhea  Negative myalgias  She denies known contact with a COVID-19 positive patient  She states that she was required to have a COVID test for work last week which was negative  HPI    Review of Systems   Review of Systems   Constitutional: Negative for activity change, chills, fatigue and fever     HENT: Negative for congestion, ear discharge, ear pain, facial swelling, mouth sores, postnasal drip, rhinorrhea, sinus pressure, sinus pain, sneezing, sore throat and trouble swallowing  Eyes: Negative for pain, discharge, redness and itching  Respiratory: Positive for chest tightness, shortness of breath and wheezing  Negative for apnea, cough and stridor  Cardiovascular: Negative for chest pain  Gastrointestinal: Negative for abdominal distention, abdominal pain, diarrhea, nausea and vomiting  Genitourinary: Negative for difficulty urinating  Musculoskeletal: Negative for arthralgias and myalgias  Skin: Negative for pallor and rash  Allergic/Immunologic: Negative  Neurological: Negative for dizziness, light-headedness and headaches  Hematological: Negative  Psychiatric/Behavioral: Negative  All other systems reviewed and are negative  Current Medications       Current Outpatient Medications:     albuterol (ACCUNEB) 1 25 MG/3ML nebulizer solution, Take 3 mL (1 25 mg total) by nebulization every 6 (six) hours as needed for wheezing, Disp: 90 mL, Rfl: 3    albuterol (PROVENTIL HFA,VENTOLIN HFA) 90 mcg/act inhaler, INHALE 2 PUFFS EVERY 4 (FOUR) HOURS AS NEEDED FOR WHEEZING OR SHORTNESS OF BREATH, Disp: 18 Inhaler, Rfl: 3    albuterol (PROVENTIL HFA,VENTOLIN HFA) 90 mcg/act inhaler, Inhale 2 puffs every 4 (four) hours as needed for wheezing or shortness of breath, Disp: 18 Inhaler, Rfl: 3    BREO ELLIPTA 100-25 MCG/INH inhaler, INHALE ONE PUFF BY MOUTH DAILY, RINSE MOUTH AFTER USE , Disp: 1 Inhaler, Rfl: 5    fluticasone-vilanterol (BREO ELLIPTA) 100-25 mcg/inh inhaler, Inhale 1 puff daily Rinse mouth after use  (Patient not taking: Reported on 10/1/2018 ), Disp: 1 Inhaler, Rfl: 0    fluticasone-vilanterol (BREO ELLIPTA) 100-25 mcg/inh inhaler, Inhale 1 puff daily Rinse mouth after use   (Patient not taking: Reported on 10/8/2019), Disp: 1 Inhaler, Rfl: 5    hydrOXYzine HCL (ATARAX) 25 mg tablet, TAKE 1 TABLET (25 MG TOTAL) BY MOUTH EVERY 6 (SIX) HOURS AS NEEDED FOR ANXIETY , Disp: , Rfl: 1    montelukast (SINGULAIR) 10 mg tablet, TAKE 1 TABLET (10 MG TOTAL) BY MOUTH DAILY AT BEDTIME, Disp: 30 tablet, Rfl: 5    predniSONE 10 mg tablet, 4 po daily x2 days, then 3 po daily x2 days, then 2 po daily x2 days,then 1 po daily x2days (Patient not taking: Reported on 10/1/2018 ), Disp: 20 tablet, Rfl: 0    predniSONE 50 mg tablet, Take 1 tablet (50 mg total) by mouth daily for 5 days, Disp: 5 tablet, Rfl: 0    sertraline (ZOLOFT) 50 mg tablet, TAKE 1 5 TABLETS (75 MG TOTAL) BY MOUTH DAILY  , Disp: , Rfl: 1    Current Allergies     Allergies as of 07/23/2020    (No Known Allergies)            The following portions of the patient's history were reviewed and updated as appropriate: allergies, current medications, past family history, past medical history, past social history, past surgical history and problem list      Past Medical History:   Diagnosis Date    Asthma        History reviewed  No pertinent surgical history  Family History   Problem Relation Age of Onset    Heart disease Father     Asthma Brother     Cancer Maternal Grandmother     Diabetes Maternal Grandmother          Medications have been verified  Objective   /84   Pulse 93   Temp 97 5 °F (36 4 °C)   Resp 18   SpO2 96%        Physical Exam     Physical Exam   Constitutional: She is oriented to person, place, and time  She appears well-developed and well-nourished  No distress  HENT:   Head: Normocephalic and atraumatic  Right Ear: Hearing, tympanic membrane, external ear and ear canal normal  No lacerations  No drainage, swelling or tenderness  No foreign bodies  No mastoid tenderness  Tympanic membrane is not injected, not scarred, not perforated, not erythematous, not retracted and not bulging  No middle ear effusion  No hemotympanum  No decreased hearing is noted     Left Ear: Hearing, tympanic membrane, external ear and ear canal normal  No lacerations  No drainage, swelling or tenderness  No foreign bodies  No mastoid tenderness  Tympanic membrane is not injected, not scarred, not perforated, not erythematous, not retracted and not bulging  No middle ear effusion  No hemotympanum  No decreased hearing is noted  Nose: Nose normal  No mucosal edema, rhinorrhea or septal deviation  Right sinus exhibits no maxillary sinus tenderness and no frontal sinus tenderness  Left sinus exhibits no maxillary sinus tenderness and no frontal sinus tenderness  Mouth/Throat: Uvula is midline, oropharynx is clear and moist and mucous membranes are normal  No oral lesions  No dental abscesses or uvula swelling  No oropharyngeal exudate, posterior oropharyngeal edema, posterior oropharyngeal erythema or tonsillar abscesses  Eyes: Pupils are equal, round, and reactive to light  Conjunctivae and EOM are normal    Neck: Trachea normal, normal range of motion and full passive range of motion without pain  Neck supple  No JVD present  No edema and no erythema present  No thyromegaly present  Cardiovascular: Normal rate, regular rhythm, S1 normal, S2 normal, normal heart sounds, intact distal pulses and normal pulses  No murmur heard  Pulmonary/Chest: Effort normal  No accessory muscle usage or stridor  No tachypnea  No respiratory distress  She has no decreased breath sounds  She has wheezes  She has no rhonchi  She has no rales  Abdominal: Soft  Normal appearance and bowel sounds are normal  She exhibits no distension  There is no hepatosplenomegaly  There is no tenderness  Musculoskeletal: Normal range of motion  She exhibits no edema  Neurological: She is alert and oriented to person, place, and time  Skin: Skin is warm, dry and intact  No abrasion, no lesion and no rash noted  She is not diaphoretic  No cyanosis or erythema  Nails show no clubbing  Psychiatric: She has a normal mood and affect   Her speech is normal and behavior is normal    Nursing note and vitals reviewed

## 2020-10-04 DIAGNOSIS — J45.41 MODERATE PERSISTENT ASTHMA WITH EXACERBATION: ICD-10-CM

## 2020-10-05 RX ORDER — ALBUTEROL SULFATE 90 UG/1
2 AEROSOL, METERED RESPIRATORY (INHALATION) EVERY 4 HOURS PRN
Qty: 18 INHALER | Refills: 0 | Status: SHIPPED | OUTPATIENT
Start: 2020-10-05 | End: 2020-11-23

## 2020-11-18 DIAGNOSIS — J45.41 MODERATE PERSISTENT ASTHMA WITH EXACERBATION: ICD-10-CM

## 2020-11-23 RX ORDER — ALBUTEROL SULFATE 90 UG/1
2 AEROSOL, METERED RESPIRATORY (INHALATION) EVERY 4 HOURS PRN
Qty: 18 INHALER | Refills: 0 | Status: SHIPPED | OUTPATIENT
Start: 2020-11-23 | End: 2021-07-12

## 2020-12-29 ENCOUNTER — OFFICE VISIT (OUTPATIENT)
Dept: FAMILY MEDICINE | Age: 30
End: 2020-12-29

## 2020-12-29 VITALS
RESPIRATION RATE: 16 BRPM | HEIGHT: 62 IN | WEIGHT: 197.9 LBS | SYSTOLIC BLOOD PRESSURE: 114 MMHG | HEART RATE: 72 BPM | DIASTOLIC BLOOD PRESSURE: 78 MMHG | TEMPERATURE: 98.1 F | BODY MASS INDEX: 36.42 KG/M2

## 2020-12-29 DIAGNOSIS — E55.9 VITAMIN D DEFICIENCY: Chronic | ICD-10-CM

## 2020-12-29 DIAGNOSIS — E78.1 HYPERTRIGLYCERIDEMIA: ICD-10-CM

## 2020-12-29 DIAGNOSIS — Z13.1 SCREENING FOR DIABETES MELLITUS (DM): ICD-10-CM

## 2020-12-29 DIAGNOSIS — Z13.220 SCREENING FOR HYPERLIPIDEMIA: ICD-10-CM

## 2020-12-29 DIAGNOSIS — Z00.00 ANNUAL PHYSICAL EXAM: Primary | ICD-10-CM

## 2020-12-29 PROCEDURE — 99395 PREV VISIT EST AGE 18-39: CPT | Performed by: NURSE PRACTITIONER

## 2020-12-29 ASSESSMENT — PATIENT HEALTH QUESTIONNAIRE - PHQ9
CLINICAL INTERPRETATION OF PHQ2 SCORE: MILD DEPRESSION
SUM OF ALL RESPONSES TO PHQ9 QUESTIONS 1 TO 9: 8
6. FEELING BAD ABOUT YOURSELF - OR THAT YOU ARE A FAILURE OR HAVE LET YOURSELF OR YOUR FAMILY DOWN: NOT AT ALL
7. TROUBLE CONCENTRATING ON THINGS, SUCH AS READING THE NEWSPAPER OR WATCHING TELEVISION: SEVERAL DAYS
10. IF YOU CHECKED OFF ANY PROBLEMS, HOW DIFFICULT HAVE THESE PROBLEMS MADE IT FOR YOU TO DO YOUR WORK, TAKE CARE OF THINGS AT HOME, OR GET ALONG WITH OTHER PEOPLE: SOMEWHAT DIFFICULT
1. LITTLE INTEREST OR PLEASURE IN DOING THINGS: SEVERAL DAYS
CLINICAL INTERPRETATION OF PHQ2 SCORE: NO FURTHER SCREENING NEEDED
SUM OF ALL RESPONSES TO PHQ QUESTIONS 1-9: 8
4. FEELING TIRED OR HAVING LITTLE ENERGY: NEARLY EVERY DAY
SUM OF ALL RESPONSES TO PHQ9 QUESTIONS 1 AND 2: 2
3. TROUBLE FALLING OR STAYING ASLEEP OR SLEEPING TOO MUCH: NOT AT ALL
8. MOVING OR SPEAKING SO SLOWLY THAT OTHER PEOPLE COULD HAVE NOTICED. OR THE OPPOSITE, BEING SO FIGETY OR RESTLESS THAT YOU HAVE BEEN MOVING AROUND A LOT MORE THAN USUAL: NOT AT ALL
CLINICAL INTERPRETATION OF PHQ9 SCORE: MILD DEPRESSION
SUM OF ALL RESPONSES TO PHQ9 QUESTIONS 1 AND 2: 2
CLINICAL INTERPRETATION OF PHQ9 SCORE: NO FURTHER SCREENING NEEDED
2. FEELING DOWN, DEPRESSED OR HOPELESS: SEVERAL DAYS
9. THOUGHTS THAT YOU WOULD BE BETTER OFF DEAD, OR OF HURTING YOURSELF: NOT AT ALL
5. POOR APPETITE, WEIGHT LOSS, OR OVEREATING: MORE THAN HALF THE DAYS

## 2020-12-29 ASSESSMENT — ANXIETY QUESTIONNAIRES
7. FEELING AFRAID AS IF SOMETHING AWFUL MIGHT HAPPEN: NOT AT ALL
2. NOT BEING ABLE TO STOP OR CONTROL WORRYING: 0
5. BEING SO RESTLESS THAT IT IS HARD TO SIT STILL: NOT AT ALL
2. NOT BEING ABLE TO STOP OR CONTROL WORRYING: NOT AT ALL
7. FEELING AFRAID AS IF SOMETHING AWFUL MIGHT HAPPEN: 0
GAD7 TOTAL SCORE: 2
3. WORRYING TOO MUCH ABOUT DIFFERENT THINGS: 0
IF YOU CHECKED OFF ANY PROBLEMS ON THIS QUESTIONNAIRE, HOW DIFFICULT HAVE THESE PROBLEMS MADE IT FOR YOU TO DO YOUR WORK, TAKE CARE OF THINGS AT HOME, OR GET ALONG WITH OTHER PEOPLE: SOMEWHAT DIFFICULT
3. WORRYING TOO MUCH ABOUT DIFFERENT THINGS: NOT AT ALL
6. BECOMING EASILY ANNOYED OR IRRITABLE: 1
5. BEING SO RESTLESS THAT IT IS HARD TO SIT STILL: 0
4. TROUBLE RELAXING: 0
6. BECOMING EASILY ANNOYED OR IRRITABLE: SEVERAL DAYS
1. FEELING NERVOUS, ANXIOUS, OR ON EDGE: SEVERAL DAYS
1. FEELING NERVOUS, ANXIOUS, OR ON EDGE: 1
4. TROUBLE RELAXING: NOT AT ALL

## 2020-12-30 ENCOUNTER — LAB SERVICES (OUTPATIENT)
Dept: LAB | Age: 30
End: 2020-12-30

## 2020-12-30 DIAGNOSIS — E55.9 VITAMIN D DEFICIENCY: Chronic | ICD-10-CM

## 2020-12-30 DIAGNOSIS — Z13.220 SCREENING FOR HYPERLIPIDEMIA: ICD-10-CM

## 2020-12-30 DIAGNOSIS — Z13.1 SCREENING FOR DIABETES MELLITUS (DM): ICD-10-CM

## 2020-12-30 PROCEDURE — 36415 COLL VENOUS BLD VENIPUNCTURE: CPT | Performed by: FAMILY MEDICINE

## 2020-12-30 PROCEDURE — 82306 VITAMIN D 25 HYDROXY: CPT | Performed by: INTERNAL MEDICINE

## 2020-12-30 PROCEDURE — 80061 LIPID PANEL: CPT | Performed by: INTERNAL MEDICINE

## 2020-12-30 PROCEDURE — 80053 COMPREHEN METABOLIC PANEL: CPT | Performed by: INTERNAL MEDICINE

## 2020-12-31 ENCOUNTER — TELEPHONE (OUTPATIENT)
Dept: FAMILY MEDICINE | Age: 30
End: 2020-12-31

## 2020-12-31 DIAGNOSIS — E55.9 VITAMIN D DEFICIENCY: ICD-10-CM

## 2020-12-31 DIAGNOSIS — E78.1 HYPERTRIGLYCERIDEMIA: Primary | ICD-10-CM

## 2020-12-31 LAB
25(OH)D3+25(OH)D2 SERPL-MCNC: 7.9 NG/ML (ref 30–100)
ALBUMIN SERPL-MCNC: 4 G/DL (ref 3.6–5.1)
ALBUMIN/GLOB SERPL: 1.7 {RATIO} (ref 1–2.4)
ALP SERPL-CCNC: 57 UNITS/L (ref 45–117)
ALT SERPL-CCNC: 58 UNITS/L
ANION GAP SERPL CALC-SCNC: 10 MMOL/L (ref 10–20)
AST SERPL-CCNC: 24 UNITS/L
BILIRUB SERPL-MCNC: 0.3 MG/DL (ref 0.2–1)
BUN SERPL-MCNC: 6 MG/DL (ref 6–20)
BUN/CREAT SERPL: 10 (ref 7–25)
CALCIUM SERPL-MCNC: 9.2 MG/DL (ref 8.4–10.2)
CHLORIDE SERPL-SCNC: 107 MMOL/L (ref 98–107)
CHOLEST SERPL-MCNC: 173 MG/DL
CHOLEST/HDLC SERPL: 4.6 {RATIO}
CO2 SERPL-SCNC: 27 MMOL/L (ref 21–32)
CREAT SERPL-MCNC: 0.61 MG/DL (ref 0.51–0.95)
FASTING DURATION TIME PATIENT: 12 HOURS
FASTING DURATION TIME PATIENT: ABNORMAL H
GFR SERPLBLD BASED ON 1.73 SQ M-ARVRAT: >90 ML/MIN/1.73M2
GLOBULIN SER-MCNC: 2.4 G/DL (ref 2–4)
GLUCOSE SERPL-MCNC: 91 MG/DL (ref 65–99)
HDLC SERPL-MCNC: 38 MG/DL
LDLC SERPL CALC-MCNC: 84 MG/DL
NONHDLC SERPL-MCNC: 135 MG/DL
POTASSIUM SERPL-SCNC: 4.2 MMOL/L (ref 3.4–5.1)
PROT SERPL-MCNC: 6.4 G/DL (ref 6.4–8.2)
SODIUM SERPL-SCNC: 140 MMOL/L (ref 135–145)
TRIGL SERPL-MCNC: 256 MG/DL

## 2020-12-31 RX ORDER — ERGOCALCIFEROL 1.25 MG/1
1.25 CAPSULE ORAL
Qty: 12 CAPSULE | Refills: 3 | Status: SHIPPED | OUTPATIENT
Start: 2021-01-04 | End: 2021-12-30 | Stop reason: ALTCHOICE

## 2020-12-31 RX ORDER — CHLORAL HYDRATE 500 MG
2000 CAPSULE ORAL DAILY
COMMUNITY

## 2020-12-31 RX ORDER — MULTIVITAMIN,THER AND MINERALS
1 TABLET ORAL DAILY
COMMUNITY

## 2021-01-25 ENCOUNTER — HOSPITAL ENCOUNTER (EMERGENCY)
Facility: HOSPITAL | Age: 31
Discharge: HOME | End: 2021-01-25
Attending: EMERGENCY MEDICINE
Payer: COMMERCIAL

## 2021-01-25 ENCOUNTER — APPOINTMENT (EMERGENCY)
Dept: RADIOLOGY | Facility: HOSPITAL | Age: 31
End: 2021-01-25
Payer: COMMERCIAL

## 2021-01-25 VITALS
RESPIRATION RATE: 20 BRPM | BODY MASS INDEX: 24.31 KG/M2 | WEIGHT: 142.42 LBS | SYSTOLIC BLOOD PRESSURE: 124 MMHG | HEIGHT: 64 IN | DIASTOLIC BLOOD PRESSURE: 73 MMHG | HEART RATE: 98 BPM | OXYGEN SATURATION: 94 % | TEMPERATURE: 97.6 F

## 2021-01-25 DIAGNOSIS — J45.901 ASTHMA EXACERBATION: Primary | ICD-10-CM

## 2021-01-25 LAB
ANION GAP SERPL CALCULATED.3IONS-SCNC: 11 MMOL/L (ref 4–13)
BASOPHILS # BLD AUTO: 0.04 THOUSANDS/ΜL (ref 0–0.1)
BASOPHILS NFR BLD AUTO: 1 % (ref 0–1)
BUN SERPL-MCNC: 6 MG/DL (ref 5–25)
CALCIUM SERPL-MCNC: 8.7 MG/DL (ref 8.3–10.1)
CHLORIDE SERPL-SCNC: 102 MMOL/L (ref 100–108)
CO2 SERPL-SCNC: 28 MMOL/L (ref 21–32)
CREAT SERPL-MCNC: 0.9 MG/DL (ref 0.6–1.3)
EOSINOPHIL # BLD AUTO: 0.67 THOUSAND/ΜL (ref 0–0.61)
EOSINOPHIL NFR BLD AUTO: 10 % (ref 0–6)
ERYTHROCYTE [DISTWIDTH] IN BLOOD BY AUTOMATED COUNT: 11.9 % (ref 11.6–15.1)
EXT PREG TEST URINE: NEGATIVE
EXT. CONTROL ED NAV: NORMAL
GFR SERPL CREATININE-BSD FRML MDRD: 85 ML/MIN/1.73SQ M
GLUCOSE SERPL-MCNC: 91 MG/DL (ref 65–140)
HCT VFR BLD AUTO: 42.8 % (ref 34.8–46.1)
HGB BLD-MCNC: 13.5 G/DL (ref 11.5–15.4)
IMM GRANULOCYTES # BLD AUTO: 0.01 THOUSAND/UL (ref 0–0.2)
IMM GRANULOCYTES NFR BLD AUTO: 0 % (ref 0–2)
LYMPHOCYTES # BLD AUTO: 2.54 THOUSANDS/ΜL (ref 0.6–4.47)
LYMPHOCYTES NFR BLD AUTO: 40 % (ref 14–44)
MCH RBC QN AUTO: 28.2 PG (ref 26.8–34.3)
MCHC RBC AUTO-ENTMCNC: 31.5 G/DL (ref 31.4–37.4)
MCV RBC AUTO: 90 FL (ref 82–98)
MONOCYTES # BLD AUTO: 0.58 THOUSAND/ΜL (ref 0.17–1.22)
MONOCYTES NFR BLD AUTO: 9 % (ref 4–12)
NEUTROPHILS # BLD AUTO: 2.59 THOUSANDS/ΜL (ref 1.85–7.62)
NEUTS SEG NFR BLD AUTO: 40 % (ref 43–75)
NRBC BLD AUTO-RTO: 0 /100 WBCS
PLATELET # BLD AUTO: 244 THOUSANDS/UL (ref 149–390)
PMV BLD AUTO: 10.6 FL (ref 8.9–12.7)
POTASSIUM SERPL-SCNC: 3.4 MMOL/L (ref 3.5–5.3)
RBC # BLD AUTO: 4.78 MILLION/UL (ref 3.81–5.12)
SODIUM SERPL-SCNC: 141 MMOL/L (ref 136–145)
WBC # BLD AUTO: 6.43 THOUSAND/UL (ref 4.31–10.16)

## 2021-01-25 PROCEDURE — 99284 EMERGENCY DEPT VISIT MOD MDM: CPT | Performed by: PHYSICIAN ASSISTANT

## 2021-01-25 PROCEDURE — 96375 TX/PRO/DX INJ NEW DRUG ADDON: CPT

## 2021-01-25 PROCEDURE — 93005 ELECTROCARDIOGRAM TRACING: CPT

## 2021-01-25 PROCEDURE — 96365 THER/PROPH/DIAG IV INF INIT: CPT

## 2021-01-25 PROCEDURE — 94640 AIRWAY INHALATION TREATMENT: CPT

## 2021-01-25 PROCEDURE — 99285 EMERGENCY DEPT VISIT HI MDM: CPT

## 2021-01-25 PROCEDURE — 36415 COLL VENOUS BLD VENIPUNCTURE: CPT | Performed by: PHYSICIAN ASSISTANT

## 2021-01-25 PROCEDURE — 81025 URINE PREGNANCY TEST: CPT | Performed by: PHYSICIAN ASSISTANT

## 2021-01-25 PROCEDURE — 71045 X-RAY EXAM CHEST 1 VIEW: CPT

## 2021-01-25 PROCEDURE — 80048 BASIC METABOLIC PNL TOTAL CA: CPT | Performed by: PHYSICIAN ASSISTANT

## 2021-01-25 PROCEDURE — 85025 COMPLETE CBC W/AUTO DIFF WBC: CPT | Performed by: PHYSICIAN ASSISTANT

## 2021-01-25 RX ORDER — ALBUTEROL SULFATE 2.5 MG/3ML
2.5 SOLUTION RESPIRATORY (INHALATION) EVERY 6 HOURS PRN
Qty: 75 ML | Refills: 0 | Status: SHIPPED | OUTPATIENT
Start: 2021-01-25

## 2021-01-25 RX ORDER — MAGNESIUM SULFATE HEPTAHYDRATE 40 MG/ML
2 INJECTION, SOLUTION INTRAVENOUS ONCE
Status: COMPLETED | OUTPATIENT
Start: 2021-01-25 | End: 2021-01-25

## 2021-01-25 RX ORDER — PREDNISONE 20 MG/1
40 TABLET ORAL DAILY
Qty: 8 TABLET | Refills: 0 | Status: SHIPPED | OUTPATIENT
Start: 2021-01-25 | End: 2021-01-29

## 2021-01-25 RX ORDER — METHYLPREDNISOLONE SODIUM SUCCINATE 125 MG/2ML
125 INJECTION, POWDER, LYOPHILIZED, FOR SOLUTION INTRAMUSCULAR; INTRAVENOUS ONCE
Status: COMPLETED | OUTPATIENT
Start: 2021-01-25 | End: 2021-01-25

## 2021-01-25 RX ADMIN — MAGNESIUM SULFATE HEPTAHYDRATE 2 G: 40 INJECTION, SOLUTION INTRAVENOUS at 03:01

## 2021-01-25 RX ADMIN — METHYLPREDNISOLONE SODIUM SUCCINATE 125 MG: 125 INJECTION, POWDER, FOR SOLUTION INTRAMUSCULAR; INTRAVENOUS at 02:45

## 2021-01-25 RX ADMIN — ALBUTEROL SULFATE 5 MG: 2.5 SOLUTION RESPIRATORY (INHALATION) at 02:43

## 2021-01-25 RX ADMIN — IPRATROPIUM BROMIDE 0.5 MG: 0.5 SOLUTION RESPIRATORY (INHALATION) at 02:43

## 2021-01-25 NOTE — ED PROVIDER NOTES
EMERGENCY MEDICINE NOTE        PATIENT IDENTIFICATION PHYSICIAN/SERVICE INFORMATION   Name: Dulce Maria Morales  MRN: [id number]  YOB: 1990  Age/Sex: 31 y o  female  Preferred Language: English  Code Status: Prior  Encounter Date: 1/25/2021  Attending Physician: Elizabet Antony MD  Admitting Physician: No admitting provider for patient encounter  Primary Care Physician: Johnathan Fernandez DO         Primary Care Phone: [phone]       CHIEF COMPLAINT     Chief Complaint   Patient presents with    Shortness of Breath     PT presents to ED from home w/ SOB starting an hour ago  Pt (+) cough starting two days ago  Pt (+) asthma, used rescue inhaler w/o relief  Pt (+) wheezing, tripoding during triage           HISTORY OF PRESENT ILLNESS       History provided by:  Patient and significant other   used: No    Shortness of Breath  Severity:  Severe  Onset quality:  Sudden  Timing:  Constant  Progression:  Waxing and waning  Chronicity:  New (similar to prior asthma exacerbations--unknown triggers, states she does not feel ill)  Context: not activity, not animal exposure, not emotional upset, not fumes, not known allergens, not occupational exposure, not pollens, not smoke exposure, not strong odors, not URI and not weather changes    Relieved by:  Nothing  Worsened by:  Nothing  Ineffective treatments:  None tried  Associated symptoms: cough (only over past few hours) and wheezing    Associated symptoms: no abdominal pain, no chest pain, no claudication, no diaphoresis, no ear pain, no fever, no headaches, no hemoptysis, no neck pain, no PND, no rash, no sore throat, no sputum production, no syncope, no swollen glands and no vomiting    Risk factors: no recent alcohol use, no family hx of DVT, no hx of cancer, no hx of PE/DVT, no obesity, no oral contraceptive use, no prolonged immobilization, no recent surgery and no tobacco use          PAST MEDICAL AND SURGICAL HISTORY     Past Medical History:   Diagnosis Date    Asthma        History reviewed  No pertinent surgical history  Family History   Problem Relation Age of Onset    Heart disease Father     Asthma Brother     Cancer Maternal Grandmother     Diabetes Maternal Grandmother        E-Cigarette/Vaping    E-Cigarette Use Never User      E-Cigarette/Vaping Substances     Social History     Tobacco Use    Smoking status: Former Smoker     Packs/day: 0 50     Years: 5 00     Pack years: 2 50     Types: Cigarettes     Quit date: 01/2018     Years since quitting: 3 0    Smokeless tobacco: Never Used   Substance Use Topics    Alcohol use: No    Drug use: No         ALLERGIES     No Known Allergies      HOME MEDICATIONS     Prior to Admission Medications   Prescriptions Last Dose Informant Patient Reported? Taking? BREO ELLIPTA 100-25 MCG/INH inhaler   No No   Sig: INHALE ONE PUFF BY MOUTH DAILY, RINSE MOUTH AFTER USE    albuterol (ACCUNEB) 1 25 MG/3ML nebulizer solution   No No   Sig: Take 3 mL (1 25 mg total) by nebulization every 6 (six) hours as needed for wheezing   albuterol (PROVENTIL HFA,VENTOLIN HFA) 90 mcg/act inhaler   No No   Sig: INHALE 2 PUFFS EVERY 4 (FOUR) HOURS AS NEEDED FOR WHEEZING OR SHORTNESS OF BREATH   albuterol (PROVENTIL HFA,VENTOLIN HFA) 90 mcg/act inhaler   No No   Sig: Inhale 2 puffs every 4 (four) hours as needed for wheezing or shortness of breath   albuterol (PROVENTIL HFA,VENTOLIN HFA) 90 mcg/act inhaler   No No   Sig: INHALE 2 PUFFS EVERY 4 (FOUR) HOURS AS NEEDED FOR WHEEZING OR SHORTNESS OF BREATH   fluticasone-vilanterol (BREO ELLIPTA) 100-25 mcg/inh inhaler   No No   Sig: Inhale 1 puff daily Rinse mouth after use  Patient not taking: Reported on 10/1/2018    fluticasone-vilanterol (BREO ELLIPTA) 100-25 mcg/inh inhaler   No No   Sig: Inhale 1 puff daily Rinse mouth after use     Patient not taking: Reported on 10/8/2019   hydrOXYzine HCL (ATARAX) 25 mg tablet   Yes No   Sig: TAKE 1 TABLET (25 MG TOTAL) BY MOUTH EVERY 6 (SIX) HOURS AS NEEDED FOR ANXIETY  montelukast (SINGULAIR) 10 mg tablet   No No   Sig: TAKE 1 TABLET (10 MG TOTAL) BY MOUTH DAILY AT BEDTIME   predniSONE 10 mg tablet   No No   Si po daily x2 days, then 3 po daily x2 days, then 2 po daily x2 days,then 1 po daily x2days   Patient not taking: Reported on 10/1/2018    sertraline (ZOLOFT) 50 mg tablet   Yes No   Sig: TAKE 1 5 TABLETS (75 MG TOTAL) BY MOUTH DAILY  Facility-Administered Medications: None         REVIEW OF SYSTEMS     Review of Systems   Constitutional: Negative for activity change, appetite change, chills, diaphoresis, fatigue and fever  HENT: Negative for congestion, drooling, ear discharge, ear pain, facial swelling, postnasal drip, rhinorrhea, sinus pressure, sinus pain, sore throat, trouble swallowing and voice change  Eyes: Negative for discharge and visual disturbance  Respiratory: Positive for cough (only over past few hours), shortness of breath and wheezing  Negative for apnea, hemoptysis, sputum production, choking, chest tightness and stridor  Cardiovascular: Negative for chest pain, palpitations, claudication, leg swelling, syncope and PND  Gastrointestinal: Negative for abdominal distention, abdominal pain, constipation, diarrhea, nausea and vomiting  Genitourinary: Negative for decreased urine volume, difficulty urinating, dysuria, flank pain, frequency, genital sores, hematuria, menstrual problem, pelvic pain, urgency, vaginal bleeding, vaginal discharge and vaginal pain  Musculoskeletal: Negative for arthralgias, joint swelling and neck pain  Skin: Negative for rash and wound  Neurological: Negative for dizziness, weakness, light-headedness, numbness and headaches  Hematological: Negative for adenopathy  Psychiatric/Behavioral: The patient is not nervous/anxious  All other systems reviewed and are negative          PHYSICAL EXAMINATION     ED Triage Vitals   Temperature Pulse Respirations Blood Pressure SpO2   01/25/21 0223 01/25/21 0223 01/25/21 0223 01/25/21 0223 01/25/21 0223   97 6 °F (36 4 °C) 97 (!) 25 161/99 91 %      Temp Source Heart Rate Source Patient Position - Orthostatic VS BP Location FiO2 (%)   01/25/21 0223 01/25/21 0307 01/25/21 0307 01/25/21 0307 --   Oral Monitor Lying Right arm       Pain Score       --                Wt Readings from Last 3 Encounters:   01/25/21 64 6 kg (142 lb 6 7 oz)   10/08/19 73 kg (161 lb)   10/01/18 64 4 kg (142 lb)         Physical Exam  Vitals signs and nursing note reviewed  Constitutional:       General: She is in acute distress  Appearance: She is well-developed  She is not ill-appearing, toxic-appearing or diaphoretic  HENT:      Head: Normocephalic and atraumatic  Mouth/Throat:      Mouth: Mucous membranes are moist       Pharynx: No pharyngeal swelling  Eyes:      Conjunctiva/sclera: Conjunctivae normal       Pupils: Pupils are equal, round, and reactive to light  Neck:      Musculoskeletal: Normal range of motion and neck supple  Vascular: No JVD  Cardiovascular:      Rate and Rhythm: Normal rate and regular rhythm  Heart sounds: Normal heart sounds  No murmur  No friction rub  No gallop  Pulmonary:      Effort: Pulmonary effort is normal  Tachypnea present  No accessory muscle usage or respiratory distress  Breath sounds: No stridor  Wheezing (throughout) present  No decreased breath sounds (mildly throughout), rhonchi or rales  Chest:      Chest wall: No tenderness  Musculoskeletal: Normal range of motion  Right lower leg: She exhibits no tenderness  No edema  Left lower leg: She exhibits no tenderness  No edema  Comments: Negative Michael's     Skin:     General: Skin is warm  Capillary Refill: Capillary refill takes less than 2 seconds  Neurological:      Mental Status: She is alert and oriented to person, place, and time     Psychiatric:         Mood and Affect: Mood is anxious  DIAGNOSTIC RESULTS     Laboratory results:    Labs Reviewed   CBC AND DIFFERENTIAL - Abnormal       Result Value Ref Range Status    WBC 6 43  4 31 - 10 16 Thousand/uL Final    RBC 4 78  3 81 - 5 12 Million/uL Final    Hemoglobin 13 5  11 5 - 15 4 g/dL Final    Hematocrit 42 8  34 8 - 46 1 % Final    MCV 90  82 - 98 fL Final    MCH 28 2  26 8 - 34 3 pg Final    MCHC 31 5  31 4 - 37 4 g/dL Final    RDW 11 9  11 6 - 15 1 % Final    MPV 10 6  8 9 - 12 7 fL Final    Platelets 450  331 - 390 Thousands/uL Final    nRBC 0  /100 WBCs Final    Neutrophils Relative 40 (*) 43 - 75 % Final    Immat GRANS % 0  0 - 2 % Final    Lymphocytes Relative 40  14 - 44 % Final    Monocytes Relative 9  4 - 12 % Final    Eosinophils Relative 10 (*) 0 - 6 % Final    Basophils Relative 1  0 - 1 % Final    Neutrophils Absolute 2 59  1 85 - 7 62 Thousands/µL Final    Immature Grans Absolute 0 01  0 00 - 0 20 Thousand/uL Final    Lymphocytes Absolute 2 54  0 60 - 4 47 Thousands/µL Final    Monocytes Absolute 0 58  0 17 - 1 22 Thousand/µL Final    Eosinophils Absolute 0 67 (*) 0 00 - 0 61 Thousand/µL Final    Basophils Absolute 0 04  0 00 - 0 10 Thousands/µL Final   BASIC METABOLIC PANEL - Abnormal    Sodium 141  136 - 145 mmol/L Final    Potassium 3 4 (*) 3 5 - 5 3 mmol/L Final    Chloride 102  100 - 108 mmol/L Final    CO2 28  21 - 32 mmol/L Final    ANION GAP 11  4 - 13 mmol/L Final    BUN 6  5 - 25 mg/dL Final    Creatinine 0 90  0 60 - 1 30 mg/dL Final    Comment: Standardized to IDMS reference method    Glucose 91  65 - 140 mg/dL Final    Comment: If the patient is fasting, the ADA then defines impaired fasting glucose as > 100 mg/dL and diabetes as > or equal to 123 mg/dL  Specimen collection should occur prior to Sulfasalazine administration due to the potential for falsely depressed results  Specimen collection should occur prior to Sulfapyridine administration due to the potential for falsely elevated results  Calcium 8 7  8 3 - 10 1 mg/dL Final    eGFR 85  ml/min/1 73sq m Final    Narrative:     [place] guidelines for Chronic Kidney Disease (CKD):     Stage 1 with normal or high GFR (GFR > 90 mL/min/1 73 square meters)    Stage 2 Mild CKD (GFR = 60-89 mL/min/1 73 square meters)    Stage 3A Moderate CKD (GFR = 45-59 mL/min/1 73 square meters)    Stage 3B Moderate CKD (GFR = 30-44 mL/min/1 73 square meters)    Stage 4 Severe CKD (GFR = 15-29 mL/min/1 73 square meters)    Stage 5 End Stage CKD (GFR <15 mL/min/1 73 square meters)  Note: GFR calculation is accurate only with a steady state creatinine   POCT PREGNANCY, URINE - Normal    EXT PREG TEST UR (Ref: Negative) negative   Final    Control valid   Final       All labs reviewed and utilized in the medical decision making process    Radiology results:    XR chest 1 view portable   ED Interpretation   No acute cardiopulmonary disease          All radiology studies independently viewed by me and interpreted by the radiologist       PROCEDURES     ECG 12 Lead Documentation Only    Date/Time: 1/25/2021 2:32 AM  Performed by: Leonard Ravi PA-C  Authorized by:  Leonard Ravi PA-C     Indications / Diagnosis:  Sob  ECG reviewed by me, the ED Provider: yes    Patient location:  ED  Previous ECG:     Previous ECG:  Compared to current    Comparison to cardiac monitor: Yes    Interpretation:     Interpretation: abnormal    Quality:     Tracing quality:  Limited by artifact  Rate:     ECG rate:  101    ECG rate assessment: tachycardic    Rhythm:     Rhythm: sinus tachycardia    Ectopy:     Ectopy: none    QRS:     QRS axis:  Normal    QRS intervals:  Normal  Conduction:     Conduction: normal    ST segments:     ST segments:  Non-specific  T waves:     T waves: normal            Invasive Devices     None                 ASSESSMENT AND PLAN     MDM  Number of Diagnoses or Management Options  Asthma exacerbation: new, needed workup     Amount and/or Complexity of Data Reviewed  Clinical lab tests: ordered and reviewed  Tests in the radiology section of CPT®: ordered and reviewed  Tests in the medicine section of CPT®: reviewed and ordered  Obtain history from someone other than the patient: yes  Review and summarize past medical records: yes  Independent visualization of images, tracings, or specimens: yes    Risk of Complications, Morbidity, and/or Mortality  Presenting problems: moderate  Diagnostic procedures: moderate  Management options: moderate    Patient Progress  Patient progress: improved      Initial ED assessment:  Jose Raul Mullins is a 31 y o  female with significant PMH for Asthma who presents with Asthma  Vitals signs reviewed and Tachycardic and Tachypneic  Physical examination is remarkable for wheezes    Initial Ddx  includes but is not limited to:    asthma exacerbation, URI, bronchitis, pneumonia; doubt PTX, PE, pneumomediastinum or cardiac etiology  Initial ED plan:   Plan will be to perform diagnostic testing of Blood labs, EKG and XR of chest and treat symptomatically   Final ED summary/disposition: Discussed results of diagnostic testing with Patient and Friend with Permission in detail  Greater than 10 minutes of education regarding diagnosis, testing, and ample opportunity for questions  Home care recommendations given with discharge paperwork  Return to ED instructions given if new/worsening sxs  Verbalized understanding  MDM  Reviewed: previous chart, nursing note and vitals  Reviewed previous: ECG  Interpretation: x-ray, labs and ECG          ED COURSE OF CARE AND REASSESSMENT          US AUDIT      Most Recent Value   Initial Alcohol Screen: US AUDIT-C    1  How often do you have a drink containing alcohol?  0 Filed at: 01/25/2021 0342   2  How many drinks containing alcohol do you have on a typical day you are drinking? 0 Filed at: 01/25/2021 0342   3a  Male UNDER 65:  How often do you have five or more drinks on one occasion? 0 Filed at: 01/25/2021 0342   3b  FEMALE Any Age, or MALE 65+: How often do you have 4 or more drinks on one occassion? 0 Filed at: 01/25/2021 0342   Audit-C Score  0 Filed at: 01/25/2021 0342                  MARKO/DAST-10      Most Recent Value   MARKO: How many times in the past year have you    Used an illegal drug or used a prescription medication for non-medical reasons? Never Filed at: 01/25/2021 5538                          Medications   albuterol inhalation solution 5 mg (5 mg Nebulization Given 1/25/21 0243)   ipratropium (ATROVENT) 0 02 % inhalation solution 0 5 mg (0 5 mg Nebulization Given 1/25/21 0243)   magnesium sulfate 2 g/50 mL IVPB (premix) 2 g (0 g Intravenous Stopped 1/25/21 0342)   methylPREDNISolone sodium succinate (Solu-MEDROL) injection 125 mg (125 mg Intravenous Given 1/25/21 0245)         FINAL IMPRESSION     Final diagnoses:   Asthma exacerbation         DISPOSITION AND PLANNING     Time reflects when diagnosis was documented in both MDM as applicable and the Disposition within this note     Time User Action Codes Description Comment    1/25/2021  3:30 AM Erin Begin Add [G48 904] Asthma exacerbation       ED Disposition     ED Disposition Condition Date/Time Comment    Discharge Stable Mon Jan 25, 2021  3:30 AM Jennifer Pollock discharge to home/self care  Follow-up Information     Follow up With Specialties Details Why Contact Info Additional Information    Esa Guillermo DO Family Medicine, Wound Care Call in 1 day For follow up [address] [facility] [address] [place] Castillo Pulmonary Disease, Pulmonology, Nurse Practitioner Call in 1 day For follow up [address], DO Allergy Call in 1 day (allergist) [address] [address] Emergency Department Emergency Medicine Go to  If symptoms worsen [address] [facility] [address] [facility] Emergency Department, [address], [place], [address] [facility], [address] [place] [address]  [phone]    Call in 1 day  For follow up    Emelia Garcia [address]  [phone]    Call in 1 day  For follow up    Sabine Goldberg [facility]  [address]  [facility]    Call in 1 day  (allergist)    [address] [facility] [address] [phone]  Go to   If symptoms worsen        DISCHARGE MEDICATIONS     Discharge Medication List as of 1/25/2021  3:34 AM      START taking these medications    Details   albuterol (2 5 mg/3 mL) 0 083 % nebulizer solution Take 1 vial (2 5 mg total) by nebulization every 6 (six) hours as needed for wheezing or shortness of breath, Starting Mon 1/25/2021, Normal      !! predniSONE 20 mg tablet Take 2 tablets (40 mg total) by mouth daily for 4 days, Starting Mon 1/25/2021, Until Fri 1/29/2021, Normal       !! - Potential duplicate medications found  Please discuss with provider        CONTINUE these medications which have NOT CHANGED    Details   albuterol (ACCUNEB) 1 25 MG/3ML nebulizer solution Take 3 mL (1 25 mg total) by nebulization every 6 (six) hours as needed for wheezing, Starting Thu 7/23/2020, Normal      !! albuterol (PROVENTIL HFA,VENTOLIN HFA) 90 mcg/act inhaler INHALE 2 PUFFS EVERY 4 (FOUR) HOURS AS NEEDED FOR WHEEZING OR SHORTNESS OF BREATH, Starting Wed 6/3/2020, Normal      !! albuterol (PROVENTIL HFA,VENTOLIN HFA) 90 mcg/act inhaler Inhale 2 puffs every 4 (four) hours as needed for wheezing or shortness of breath, Starting Thu 7/23/2020, Normal      !! albuterol (PROVENTIL HFA,VENTOLIN HFA) 90 mcg/act inhaler INHALE 2 PUFFS EVERY 4 (FOUR) HOURS AS NEEDED FOR WHEEZING OR SHORTNESS OF BREATH, Starting Mon 11/23/2020, Normal      !! BREO ELLIPTA 100-25 MCG/INH inhaler INHALE ONE PUFF BY MOUTH DAILY, RINSE MOUTH AFTER USE , Normal      !! fluticasone-vilanterol (BREO ELLIPTA) 100-25 mcg/inh inhaler Inhale 1 puff daily Rinse mouth after use , Starting Sat 9/15/2018, Print      !! fluticasone-vilanterol (BREO ELLIPTA) 100-25 mcg/inh inhaler Inhale 1 puff daily Rinse mouth after use , Starting Mon 10/1/2018, Print      hydrOXYzine HCL (ATARAX) 25 mg tablet TAKE 1 TABLET (25 MG TOTAL) BY MOUTH EVERY 6 (SIX) HOURS AS NEEDED FOR ANXIETY , Historical Med      montelukast (SINGULAIR) 10 mg tablet TAKE 1 TABLET (10 MG TOTAL) BY MOUTH DAILY AT BEDTIME, Starting Tue 10/22/2019, Normal      !! predniSONE 10 mg tablet 4 po daily x2 days, then 3 po daily x2 days, then 2 po daily x2 days,then 1 po daily x2days, Print      sertraline (ZOLOFT) 50 mg tablet TAKE 1 5 TABLETS (75 MG TOTAL) BY MOUTH DAILY  , Historical Med       !! - Potential duplicate medications found  Please discuss with provider  No discharge procedures on file      PDMP Review       Value Time User    PDMP Reviewed  Yes 1/25/2021  2:24 AM MD Venkatesh Seaman PA-C Turner Anger, PA-C  01/25/21 8832

## 2021-01-25 NOTE — Clinical Note
Jessica Esteves was seen and treated in our emergency department on 1/25/2021  Diagnosis:     Neha Perez  may return to work on return date  She may return on this date: 01/26/2021         If you have any questions or concerns, please don't hesitate to call        Martina Washington PA-C    ______________________________           _______________          _______________  Hospital Representative                              Date                                Time

## 2021-01-26 LAB
ATRIAL RATE: 101 BPM
P AXIS: 72 DEGREES
PR INTERVAL: 114 MS
QRS AXIS: 86 DEGREES
QRSD INTERVAL: 76 MS
QT INTERVAL: 310 MS
QTC INTERVAL: 401 MS
T WAVE AXIS: -37 DEGREES
VENTRICULAR RATE: 101 BPM

## 2021-01-26 PROCEDURE — 93010 ELECTROCARDIOGRAM REPORT: CPT | Performed by: INTERNAL MEDICINE

## 2021-02-05 DIAGNOSIS — J45.41 MODERATE PERSISTENT ASTHMA WITH EXACERBATION: ICD-10-CM

## 2021-02-05 RX ORDER — ALBUTEROL SULFATE 90 UG/1
2 AEROSOL, METERED RESPIRATORY (INHALATION) EVERY 4 HOURS PRN
Qty: 18 INHALER | Refills: 0 | OUTPATIENT
Start: 2021-02-05

## 2021-02-08 ENCOUNTER — TELEPHONE (OUTPATIENT)
Dept: PULMONOLOGY | Facility: CLINIC | Age: 31
End: 2021-02-08

## 2021-02-08 NOTE — TELEPHONE ENCOUNTER
Received request to refill patients rescue inhaler  Patient has not been seen in over a year  Attempted to call  LM to call back

## 2021-03-02 ENCOUNTER — APPOINTMENT (EMERGENCY)
Dept: CT IMAGING | Facility: HOSPITAL | Age: 31
End: 2021-03-02
Payer: COMMERCIAL

## 2021-03-02 ENCOUNTER — HOSPITAL ENCOUNTER (EMERGENCY)
Facility: HOSPITAL | Age: 31
Discharge: HOME | End: 2021-03-02
Attending: EMERGENCY MEDICINE | Admitting: EMERGENCY MEDICINE
Payer: COMMERCIAL

## 2021-03-02 VITALS
HEIGHT: 63 IN | SYSTOLIC BLOOD PRESSURE: 117 MMHG | WEIGHT: 147.49 LBS | OXYGEN SATURATION: 100 % | HEART RATE: 67 BPM | DIASTOLIC BLOOD PRESSURE: 71 MMHG | TEMPERATURE: 98.1 F | BODY MASS INDEX: 26.13 KG/M2 | RESPIRATION RATE: 16 BRPM

## 2021-03-02 DIAGNOSIS — R51.9 HEADACHE: Primary | ICD-10-CM

## 2021-03-02 LAB
EXT PREG TEST URINE: NEGATIVE
EXT. CONTROL ED NAV: NORMAL

## 2021-03-02 PROCEDURE — 99284 EMERGENCY DEPT VISIT MOD MDM: CPT

## 2021-03-02 PROCEDURE — 96375 TX/PRO/DX INJ NEW DRUG ADDON: CPT

## 2021-03-02 PROCEDURE — 96365 THER/PROPH/DIAG IV INF INIT: CPT

## 2021-03-02 PROCEDURE — 70450 CT HEAD/BRAIN W/O DYE: CPT

## 2021-03-02 PROCEDURE — 99285 EMERGENCY DEPT VISIT HI MDM: CPT | Performed by: EMERGENCY MEDICINE

## 2021-03-02 PROCEDURE — 81025 URINE PREGNANCY TEST: CPT | Performed by: EMERGENCY MEDICINE

## 2021-03-02 PROCEDURE — G1004 CDSM NDSC: HCPCS

## 2021-03-02 RX ORDER — METOCLOPRAMIDE HYDROCHLORIDE 5 MG/ML
5 INJECTION INTRAMUSCULAR; INTRAVENOUS ONCE
Status: COMPLETED | OUTPATIENT
Start: 2021-03-02 | End: 2021-03-02

## 2021-03-02 RX ORDER — MAGNESIUM SULFATE HEPTAHYDRATE 40 MG/ML
2 INJECTION, SOLUTION INTRAVENOUS ONCE
Status: COMPLETED | OUTPATIENT
Start: 2021-03-02 | End: 2021-03-02

## 2021-03-02 RX ORDER — DIPHENHYDRAMINE HYDROCHLORIDE 50 MG/ML
25 INJECTION INTRAMUSCULAR; INTRAVENOUS ONCE
Status: COMPLETED | OUTPATIENT
Start: 2021-03-02 | End: 2021-03-02

## 2021-03-02 RX ORDER — ACETAMINOPHEN 325 MG/1
650 TABLET ORAL ONCE
Status: COMPLETED | OUTPATIENT
Start: 2021-03-02 | End: 2021-03-02

## 2021-03-02 RX ADMIN — MAGNESIUM SULFATE HEPTAHYDRATE 2 G: 40 INJECTION, SOLUTION INTRAVENOUS at 08:24

## 2021-03-02 RX ADMIN — SODIUM CHLORIDE 1000 ML: 0.9 INJECTION, SOLUTION INTRAVENOUS at 08:02

## 2021-03-02 RX ADMIN — DIPHENHYDRAMINE HYDROCHLORIDE 25 MG: 50 INJECTION, SOLUTION INTRAMUSCULAR; INTRAVENOUS at 08:01

## 2021-03-02 RX ADMIN — METOCLOPRAMIDE HYDROCHLORIDE 5 MG: 5 INJECTION INTRAMUSCULAR; INTRAVENOUS at 08:01

## 2021-03-02 RX ADMIN — ACETAMINOPHEN 650 MG: 325 TABLET, FILM COATED ORAL at 07:56

## 2021-03-02 NOTE — ED PROVIDER NOTES
History  Chief Complaint   Patient presents with    Headache     PT c/o headache x2 days and could not see out of left eye when she woke up this am  ("can see a little but its like a cover is over my eye") PT is currently under the care of  concussion/trauma doctors for concussion in December       History provided by:  Patient   used: No    Headache  Associated symptoms: no abdominal pain, no congestion, no cough, no diarrhea, no dizziness, no eye pain, no fever, no nausea, no neck pain, no neck stiffness, no sore throat, no vomiting and no weakness      Patient is a 48-year-old female presenting to emergency department with headache that is present for last 2 days  Frontal   Over the left side  Today morning woke up with unable to see from left eye  Total lasted about 10 minutes and self-resolved with blurriness left over  No chest pain or shortness of breath  No lightheadedness or dizziness  No nausea vomiting  No fevers or chills  Did have a concussion diagnosed in December  Has not had any imaging for it  Supposed to get an MRI in  10 days  Denies drugs  No alcohol  Smoker  MDM this is likely headache related, will treat headache, will CT head as patient has not had any imaging for the concussion previously and complaining of vision loss      Prior to Admission Medications   Prescriptions Last Dose Informant Patient Reported? Taking?    BREO ELLIPTA 100-25 MCG/INH inhaler   No No   Sig: INHALE ONE PUFF BY MOUTH DAILY, RINSE MOUTH AFTER USE    albuterol (2 5 mg/3 mL) 0 083 % nebulizer solution   No No   Sig: Take 1 vial (2 5 mg total) by nebulization every 6 (six) hours as needed for wheezing or shortness of breath   albuterol (ACCUNEB) 1 25 MG/3ML nebulizer solution   No No   Sig: Take 3 mL (1 25 mg total) by nebulization every 6 (six) hours as needed for wheezing   albuterol (PROVENTIL HFA,VENTOLIN HFA) 90 mcg/act inhaler   No No   Sig: INHALE 2 PUFFS EVERY 4 (FOUR) HOURS AS NEEDED FOR WHEEZING OR SHORTNESS OF BREATH   albuterol (PROVENTIL HFA,VENTOLIN HFA) 90 mcg/act inhaler   No No   Sig: Inhale 2 puffs every 4 (four) hours as needed for wheezing or shortness of breath   albuterol (PROVENTIL HFA,VENTOLIN HFA) 90 mcg/act inhaler   No No   Sig: INHALE 2 PUFFS EVERY 4 (FOUR) HOURS AS NEEDED FOR WHEEZING OR SHORTNESS OF BREATH   fluticasone-vilanterol (BREO ELLIPTA) 100-25 mcg/inh inhaler   No No   Sig: Inhale 1 puff daily Rinse mouth after use  Patient not taking: Reported on 10/1/2018    fluticasone-vilanterol (BREO ELLIPTA) 100-25 mcg/inh inhaler   No No   Sig: Inhale 1 puff daily Rinse mouth after use  Patient not taking: Reported on 10/8/2019   hydrOXYzine HCL (ATARAX) 25 mg tablet   Yes No   Sig: TAKE 1 TABLET (25 MG TOTAL) BY MOUTH EVERY 6 (SIX) HOURS AS NEEDED FOR ANXIETY  montelukast (SINGULAIR) 10 mg tablet   No No   Sig: TAKE 1 TABLET (10 MG TOTAL) BY MOUTH DAILY AT BEDTIME   predniSONE 10 mg tablet   No No   Si po daily x2 days, then 3 po daily x2 days, then 2 po daily x2 days,then 1 po daily x2days   Patient not taking: Reported on 10/1/2018    sertraline (ZOLOFT) 50 mg tablet   Yes No   Sig: TAKE 1 5 TABLETS (75 MG TOTAL) BY MOUTH DAILY  Facility-Administered Medications: None       Past Medical History:   Diagnosis Date    Asthma        History reviewed  No pertinent surgical history  Family History   Problem Relation Age of Onset    Heart disease Father     Asthma Brother     Cancer Maternal Grandmother     Diabetes Maternal Grandmother      I have reviewed and agree with the history as documented      E-Cigarette/Vaping    E-Cigarette Use Never User      E-Cigarette/Vaping Substances     Social History     Tobacco Use    Smoking status: Former Smoker     Packs/day: 0 50     Years: 5 00     Pack years: 2 50     Types: Cigarettes     Quit date: 2018     Years since quitting: 3 1    Smokeless tobacco: Never Used   Substance Use Topics    Alcohol use: No    Drug use: No       Review of Systems   Constitutional: Negative for chills, diaphoresis and fever  HENT: Negative for congestion and sore throat  Eyes: Positive for visual disturbance  Negative for pain and redness  Respiratory: Negative for cough, shortness of breath, wheezing and stridor  Cardiovascular: Negative for chest pain, palpitations and leg swelling  Gastrointestinal: Negative for abdominal pain, blood in stool, diarrhea, nausea and vomiting  Genitourinary: Negative for dysuria, frequency and urgency  Musculoskeletal: Negative for neck pain and neck stiffness  Skin: Negative for pallor and rash  Neurological: Positive for headaches  Negative for dizziness, syncope, weakness and light-headedness  All other systems reviewed and are negative  Physical Exam  Physical Exam  Vitals signs reviewed  Constitutional:       Appearance: She is well-developed  HENT:      Head: Normocephalic and atraumatic  Eyes:      Extraocular Movements: Extraocular movements intact  Conjunctiva/sclera: Conjunctivae normal       Pupils: Pupils are equal, round, and reactive to light  Comments: No photophobia   Neck:      Musculoskeletal: Neck supple  Cardiovascular:      Rate and Rhythm: Normal rate and regular rhythm  Heart sounds: Normal heart sounds  Pulmonary:      Effort: Pulmonary effort is normal  No respiratory distress  Breath sounds: Normal breath sounds  Abdominal:      General: Bowel sounds are normal       Palpations: Abdomen is soft  Tenderness: There is no abdominal tenderness  Musculoskeletal: Normal range of motion  Skin:     General: Skin is warm and dry  Capillary Refill: Capillary refill takes less than 2 seconds  Neurological:      General: No focal deficit present  Mental Status: She is alert and oriented to person, place, and time  Mental status is at baseline  Cranial Nerves: No cranial nerve deficit  Sensory: No sensory deficit  Motor: No weakness  Coordination: Coordination normal       Gait: Gait normal       Comments: No visual field cuts         Vital Signs  ED Triage Vitals   Temperature Pulse Respirations Blood Pressure SpO2   03/02/21 0727 03/02/21 0727 03/02/21 0727 03/02/21 0727 03/02/21 0727   98 1 °F (36 7 °C) 80 18 131/82 97 %      Temp Source Heart Rate Source Patient Position - Orthostatic VS BP Location FiO2 (%)   03/02/21 0727 03/02/21 0727 03/02/21 0727 03/02/21 0727 --   Oral Monitor Lying Right arm       Pain Score       03/02/21 0730       7           Vitals:    03/02/21 0727 03/02/21 0927   BP: 131/82 117/71   Pulse: 80 67   Patient Position - Orthostatic VS: Lying          Visual Acuity  Visual Acuity      Most Recent Value   Visual acuity R eye is  20/40   Visual acuity Left eye is  20/50   Visual acuity in both eyes is  20/30   Wearing corrective eyewear/lenses? Yes   L Pupil Size (mm)  4   R Pupil Size (mm)  4          ED Medications  Medications   acetaminophen (TYLENOL) tablet 650 mg (650 mg Oral Given 3/2/21 0756)   diphenhydrAMINE (BENADRYL) injection 25 mg (25 mg Intravenous Given 3/2/21 0801)   metoclopramide (REGLAN) injection 5 mg (5 mg Intravenous Given 3/2/21 0801)   sodium chloride 0 9 % bolus 1,000 mL (0 mL Intravenous Stopped 3/2/21 0926)   magnesium sulfate 2 g/50 mL IVPB (premix) 2 g (0 g Intravenous Stopped 3/2/21 0926)       Diagnostic Studies  Results Reviewed     Procedure Component Value Units Date/Time    POCT pregnancy, urine [[id number]]  (Normal) Resulted: 03/02/21 0755    Lab Status: Final result Updated: 03/02/21 0756     EXT PREG TEST UR (Ref: Negative) Negative     Control Valid                 CT head without contrast   Final Result by Neela Manzanares MD (03/02 7007)      No acute intracranial abnormality                    Workstation performed: [id number]                    Procedures  Procedures         ED Course  ED Course as of Mar 02 1403 Tue Mar 02, 2021   0810 Left eye 20/50, right eye 20/40, 20/30 combined      0915 Patient's headache is improved  Vision is improved  Discussed case with Dr Kelley Chandler from Neurology  States this is likely headache related and does not need more workup in the hospital   Maryjo Mckeon to follow-up as outpatient  MDM    Disposition  Final diagnoses:   Headache     Time reflects when diagnosis was documented in both MDM as applicable and the Disposition within this note     Time User Action Codes Description Comment    3/2/2021  9:15 AM Jeanne Lawrence Add [R51 9] Headache       ED Disposition     ED Disposition Condition Date/Time Comment    Discharge Stable Tue Mar 2, 2021  9:15 AM Michael Harry discharge to home/self care              Follow-up Information     Follow up With Specialties Details Why Contact Info Additional Information    [facility] Emergency Medicine  As needed, If symptoms worsen [address] Emergency Department, [address], [place], [facility]    Concussion clinic  Schedule an appointment as soon as possible for a visit  Please follow-up with your concussion clinic      [facility]  Call  Please call to get a family doctor and follow-up [phone]             Discharge Medication List as of 3/2/2021  9:19 AM      CONTINUE these medications which have NOT CHANGED    Details   albuterol (2 5 mg/3 mL) 0 083 % nebulizer solution Take 1 vial (2 5 mg total) by nebulization every 6 (six) hours as needed for wheezing or shortness of breath, Starting Mon 1/25/2021, Normal      albuterol (ACCUNEB) 1 25 MG/3ML nebulizer solution Take 3 mL (1 25 mg total) by nebulization every 6 (six) hours as needed for wheezing, Starting Thu 7/23/2020, Normal      !! albuterol (PROVENTIL HFA,VENTOLIN HFA) 90 mcg/act inhaler INHALE 2 PUFFS EVERY 4 (FOUR) HOURS AS NEEDED FOR WHEEZING OR SHORTNESS OF BREATH, Starting Wed 6/3/2020, Normal      !! albuterol (PROVENTIL HFA,VENTOLIN HFA) 90 mcg/act inhaler Inhale 2 puffs every 4 (four) hours as needed for wheezing or shortness of breath, Starting Thu 7/23/2020, Normal      !! albuterol (PROVENTIL HFA,VENTOLIN HFA) 90 mcg/act inhaler INHALE 2 PUFFS EVERY 4 (FOUR) HOURS AS NEEDED FOR WHEEZING OR SHORTNESS OF BREATH, Starting Mon 11/23/2020, Normal      !! BREO ELLIPTA 100-25 MCG/INH inhaler INHALE ONE PUFF BY MOUTH DAILY, RINSE MOUTH AFTER USE , Normal      !! fluticasone-vilanterol (BREO ELLIPTA) 100-25 mcg/inh inhaler Inhale 1 puff daily Rinse mouth after use , Starting Sat 9/15/2018, Print      !! fluticasone-vilanterol (BREO ELLIPTA) 100-25 mcg/inh inhaler Inhale 1 puff daily Rinse mouth after use , Starting Mon 10/1/2018, Print      hydrOXYzine HCL (ATARAX) 25 mg tablet TAKE 1 TABLET (25 MG TOTAL) BY MOUTH EVERY 6 (SIX) HOURS AS NEEDED FOR ANXIETY , Historical Med      montelukast (SINGULAIR) 10 mg tablet TAKE 1 TABLET (10 MG TOTAL) BY MOUTH DAILY AT BEDTIME, Starting Tue 10/22/2019, Normal      predniSONE 10 mg tablet 4 po daily x2 days, then 3 po daily x2 days, then 2 po daily x2 days,then 1 po daily x2days, Print      sertraline (ZOLOFT) 50 mg tablet TAKE 1 5 TABLETS (75 MG TOTAL) BY MOUTH DAILY  , Historical Med       !! - Potential duplicate medications found  Please discuss with provider  No discharge procedures on file      PDMP Review       Value Time User    PDMP Reviewed  Yes 1/25/2021  2:24 AM John Zendejas MD          ED Provider  Electronically Signed by           Kip Ricci MD  03/02/21 8270

## 2021-03-29 ENCOUNTER — APPOINTMENT (OUTPATIENT)
Dept: LAB | Age: 31
End: 2021-03-29

## 2021-07-12 ENCOUNTER — HOSPITAL ENCOUNTER (EMERGENCY)
Facility: HOSPITAL | Age: 31
Discharge: HOME | End: 2021-07-12
Attending: EMERGENCY MEDICINE | Admitting: EMERGENCY MEDICINE
Payer: COMMERCIAL

## 2021-07-12 ENCOUNTER — APPOINTMENT (EMERGENCY)
Dept: RADIOLOGY | Facility: HOSPITAL | Age: 31
End: 2021-07-12
Payer: COMMERCIAL

## 2021-07-12 VITALS
DIASTOLIC BLOOD PRESSURE: 73 MMHG | BODY MASS INDEX: 22.15 KG/M2 | WEIGHT: 125 LBS | SYSTOLIC BLOOD PRESSURE: 105 MMHG | RESPIRATION RATE: 18 BRPM | OXYGEN SATURATION: 98 % | HEART RATE: 77 BPM | TEMPERATURE: 96.6 F | HEIGHT: 63 IN

## 2021-07-12 DIAGNOSIS — R51.9 HEADACHE: ICD-10-CM

## 2021-07-12 DIAGNOSIS — R55 SYNCOPE: Primary | ICD-10-CM

## 2021-07-12 DIAGNOSIS — R20.2 PARESTHESIA: ICD-10-CM

## 2021-07-12 LAB
ALBUMIN SERPL BCP-MCNC: 3.8 G/DL (ref 3.5–5)
ALP SERPL-CCNC: 59 U/L (ref 46–116)
ALT SERPL W P-5'-P-CCNC: 28 U/L (ref 12–78)
AMPHETAMINES SERPL QL SCN: NEGATIVE
ANION GAP SERPL CALCULATED.3IONS-SCNC: 9 MMOL/L (ref 4–13)
APTT PPP: 29 SECONDS (ref 23–37)
AST SERPL W P-5'-P-CCNC: 16 U/L (ref 5–45)
BARBITURATES UR QL: NEGATIVE
BASOPHILS # BLD AUTO: 0.02 THOUSANDS/ΜL (ref 0–0.1)
BASOPHILS NFR BLD AUTO: 0 % (ref 0–1)
BENZODIAZ UR QL: NEGATIVE
BILIRUB SERPL-MCNC: 0.26 MG/DL (ref 0.2–1)
BILIRUB UR QL STRIP: NEGATIVE
BUN SERPL-MCNC: 7 MG/DL (ref 5–25)
CALCIUM SERPL-MCNC: 8.8 MG/DL (ref 8.3–10.1)
CHLORIDE SERPL-SCNC: 103 MMOL/L (ref 100–108)
CLARITY UR: CLEAR
CO2 SERPL-SCNC: 28 MMOL/L (ref 21–32)
COCAINE UR QL: NEGATIVE
COLOR UR: NORMAL
CREAT SERPL-MCNC: 0.78 MG/DL (ref 0.6–1.3)
EOSINOPHIL # BLD AUTO: 0.21 THOUSAND/ΜL (ref 0–0.61)
EOSINOPHIL NFR BLD AUTO: 4 % (ref 0–6)
ERYTHROCYTE [DISTWIDTH] IN BLOOD BY AUTOMATED COUNT: 11.8 % (ref 11.6–15.1)
EXT PREG TEST URINE: NEGATIVE
EXT. CONTROL ED NAV: NORMAL
GFR SERPL CREATININE-BSD FRML MDRD: 101 ML/MIN/1.73SQ M
GLUCOSE SERPL-MCNC: 95 MG/DL (ref 65–140)
GLUCOSE UR STRIP-MCNC: NEGATIVE MG/DL
HCT VFR BLD AUTO: 43.2 % (ref 34.8–46.1)
HGB BLD-MCNC: 14.2 G/DL (ref 11.5–15.4)
HGB UR QL STRIP.AUTO: NEGATIVE
IMM GRANULOCYTES # BLD AUTO: 0 THOUSAND/UL (ref 0–0.2)
IMM GRANULOCYTES NFR BLD AUTO: 0 % (ref 0–2)
INR PPP: 1.01 (ref 0.84–1.19)
KETONES UR STRIP-MCNC: NEGATIVE MG/DL
LEUKOCYTE ESTERASE UR QL STRIP: NEGATIVE
LYMPHOCYTES # BLD AUTO: 1.68 THOUSANDS/ΜL (ref 0.6–4.47)
LYMPHOCYTES NFR BLD AUTO: 30 % (ref 14–44)
MAGNESIUM SERPL-MCNC: 1.6 MG/DL (ref 1.6–2.6)
MCH RBC QN AUTO: 29.6 PG (ref 26.8–34.3)
MCHC RBC AUTO-ENTMCNC: 32.9 G/DL (ref 31.4–37.4)
MCV RBC AUTO: 90 FL (ref 82–98)
METHADONE UR QL: POSITIVE
MONOCYTES # BLD AUTO: 0.57 THOUSAND/ΜL (ref 0.17–1.22)
MONOCYTES NFR BLD AUTO: 10 % (ref 4–12)
NEUTROPHILS # BLD AUTO: 3.15 THOUSANDS/ΜL (ref 1.85–7.62)
NEUTS SEG NFR BLD AUTO: 56 % (ref 43–75)
NITRITE UR QL STRIP: NEGATIVE
NRBC BLD AUTO-RTO: 0 /100 WBCS
OPIATES UR QL SCN: NEGATIVE
OXYCODONE+OXYMORPHONE UR QL SCN: NEGATIVE
PCP UR QL: NEGATIVE
PH UR STRIP.AUTO: 5.5 [PH]
PLATELET # BLD AUTO: 194 THOUSANDS/UL (ref 149–390)
PMV BLD AUTO: 10.3 FL (ref 8.9–12.7)
POTASSIUM SERPL-SCNC: 3.4 MMOL/L (ref 3.5–5.3)
PROT SERPL-MCNC: 6.9 G/DL (ref 6.4–8.2)
PROT UR STRIP-MCNC: NEGATIVE MG/DL
PROTHROMBIN TIME: 13.2 SECONDS (ref 11.6–14.5)
RBC # BLD AUTO: 4.8 MILLION/UL (ref 3.81–5.12)
SODIUM SERPL-SCNC: 140 MMOL/L (ref 136–145)
SP GR UR STRIP.AUTO: <=1.005 (ref 1–1.03)
T4 FREE SERPL-MCNC: 1.06 NG/DL (ref 0.76–1.46)
THC UR QL: NEGATIVE
TROPONIN I SERPL-MCNC: <0.02 NG/ML
TSH SERPL DL<=0.05 MIU/L-ACNC: 5.91 UIU/ML (ref 0.36–3.74)
UROBILINOGEN UR QL STRIP.AUTO: 0.2 E.U./DL
WBC # BLD AUTO: 5.63 THOUSAND/UL (ref 4.31–10.16)

## 2021-07-12 PROCEDURE — 80053 COMPREHEN METABOLIC PANEL: CPT | Performed by: EMERGENCY MEDICINE

## 2021-07-12 PROCEDURE — 96375 TX/PRO/DX INJ NEW DRUG ADDON: CPT

## 2021-07-12 PROCEDURE — 84484 ASSAY OF TROPONIN QUANT: CPT | Performed by: EMERGENCY MEDICINE

## 2021-07-12 PROCEDURE — 93005 ELECTROCARDIOGRAM TRACING: CPT

## 2021-07-12 PROCEDURE — 71045 X-RAY EXAM CHEST 1 VIEW: CPT

## 2021-07-12 PROCEDURE — 96374 THER/PROPH/DIAG INJ IV PUSH: CPT

## 2021-07-12 PROCEDURE — 81025 URINE PREGNANCY TEST: CPT | Performed by: EMERGENCY MEDICINE

## 2021-07-12 PROCEDURE — 84439 ASSAY OF FREE THYROXINE: CPT | Performed by: EMERGENCY MEDICINE

## 2021-07-12 PROCEDURE — 80307 DRUG TEST PRSMV CHEM ANLYZR: CPT | Performed by: EMERGENCY MEDICINE

## 2021-07-12 PROCEDURE — G1004 CDSM NDSC: HCPCS

## 2021-07-12 PROCEDURE — 99285 EMERGENCY DEPT VISIT HI MDM: CPT

## 2021-07-12 PROCEDURE — 85610 PROTHROMBIN TIME: CPT | Performed by: EMERGENCY MEDICINE

## 2021-07-12 PROCEDURE — 70498 CT ANGIOGRAPHY NECK: CPT

## 2021-07-12 PROCEDURE — 81003 URINALYSIS AUTO W/O SCOPE: CPT | Performed by: EMERGENCY MEDICINE

## 2021-07-12 PROCEDURE — 85730 THROMBOPLASTIN TIME PARTIAL: CPT | Performed by: EMERGENCY MEDICINE

## 2021-07-12 PROCEDURE — 36415 COLL VENOUS BLD VENIPUNCTURE: CPT | Performed by: EMERGENCY MEDICINE

## 2021-07-12 PROCEDURE — 85025 COMPLETE CBC W/AUTO DIFF WBC: CPT | Performed by: EMERGENCY MEDICINE

## 2021-07-12 PROCEDURE — 96361 HYDRATE IV INFUSION ADD-ON: CPT

## 2021-07-12 PROCEDURE — 83735 ASSAY OF MAGNESIUM: CPT | Performed by: EMERGENCY MEDICINE

## 2021-07-12 PROCEDURE — 70496 CT ANGIOGRAPHY HEAD: CPT

## 2021-07-12 PROCEDURE — 99285 EMERGENCY DEPT VISIT HI MDM: CPT | Performed by: EMERGENCY MEDICINE

## 2021-07-12 PROCEDURE — 84443 ASSAY THYROID STIM HORMONE: CPT | Performed by: EMERGENCY MEDICINE

## 2021-07-12 RX ORDER — DIPHENHYDRAMINE HYDROCHLORIDE 50 MG/ML
50 INJECTION INTRAMUSCULAR; INTRAVENOUS ONCE
Status: COMPLETED | OUTPATIENT
Start: 2021-07-12 | End: 2021-07-12

## 2021-07-12 RX ORDER — METOCLOPRAMIDE HYDROCHLORIDE 5 MG/ML
10 INJECTION INTRAMUSCULAR; INTRAVENOUS ONCE
Status: COMPLETED | OUTPATIENT
Start: 2021-07-12 | End: 2021-07-12

## 2021-07-12 RX ORDER — KETOROLAC TROMETHAMINE 30 MG/ML
15 INJECTION, SOLUTION INTRAMUSCULAR; INTRAVENOUS ONCE
Status: COMPLETED | OUTPATIENT
Start: 2021-07-12 | End: 2021-07-12

## 2021-07-12 RX ADMIN — DIPHENHYDRAMINE HYDROCHLORIDE 50 MG: 50 INJECTION, SOLUTION INTRAMUSCULAR; INTRAVENOUS at 06:50

## 2021-07-12 RX ADMIN — METOCLOPRAMIDE 10 MG: 5 INJECTION, SOLUTION INTRAMUSCULAR; INTRAVENOUS at 06:50

## 2021-07-12 RX ADMIN — KETOROLAC TROMETHAMINE 15 MG: 30 INJECTION, SOLUTION INTRAMUSCULAR at 06:50

## 2021-07-12 RX ADMIN — IOHEXOL 80 ML: 350 INJECTION, SOLUTION INTRAVENOUS at 07:32

## 2021-07-12 RX ADMIN — SODIUM CHLORIDE 1000 ML: 0.9 INJECTION, SOLUTION INTRAVENOUS at 06:51

## 2021-07-12 NOTE — ED CARE HANDOFF
Emergency Department Sign Out Note        Sign out and transfer of care from Dr Franci Aragon  See Separate Emergency Department note  The patient, Jamal Cash, was evaluated by the previous provider for Syncope, Headache, Paresthesia  Workup Completed:  Lab work and CTA  ED Course / Workup Pending (followup): Patient signed out to me pending lab work and CT results  On re-examination patient reported feeling better headache a resolved she had no return of the paresthesias was feeling back to herself  Discussed CT and lab results with patient  Discussed the TSH was elevated and that reflex to T4  This will require follow-up with primary care doctor for further evaluation  Patient is currently seen Neurology through concussion center [facility]  Encouraged to have a follow-up as well given the continued headaches as well as paresthesias for further evaluation  At this time low likelihood for stroke and patient is low risk and can follow-up outpatient  Procedures  MDM    Disposition  Final diagnoses:   Syncope   Paresthesia   Headache     Time reflects when diagnosis was documented in both MDM as applicable and the Disposition within this note     Time User Action Codes Description Comment    7/12/2021  6:55 AM Rolin Bogus Add [R55] Syncope     7/12/2021  6:55 AM Rolin Bogus Add [R20 2] Paresthesia     7/12/2021  7:07 AM Rolin Bogus Add [R51 9] Headache       ED Disposition     ED Disposition Condition Date/Time Comment    Discharge Stable Mon Jul 12, 2021  [address] discharge to home/self care  Follow-up Information     Follow up With Specialties Details Why Contact Info    [facility]  In 1 week [phone]          Patient's Medications   Discharge Prescriptions    No medications on file     No discharge procedures on file         ED Provider  Electronically Signed by     Trace Jerome DO  07/12/21 8152

## 2021-07-12 NOTE — ED PROVIDER NOTES
History  Chief Complaint   Patient presents with    Numbness     pt states she woke up at 0430 this am with upper right sided numbness & said she then passed out in the bathroom  77-year-old female with past history of asthma, postconcussive headaches, presents to the ED for evaluation of paresthesia sensation and syncope  Patient states that she has had recurring headaches after concussion over the past few months  Patient started with a similar headache 3 days ago  Patient has not taken anything for her headache and is trying to manage  Early this morning patient woke up around 2:30 a m  With headache and feels some tingling sensation to the right upper extremity  Patient then went to the bathroom and felt lightheaded  Patient then found herself stooped over on the floor  Patient cannot recall how she got there  After she woke up she did feel the paresthesia anymore  Around 4:30 a m  Patient had other episodes of tingling sensation to the right upper extremity that lasted for several minutes and subsided  Patient continues to have a headache  Patient has had some photophobia that is usual with her headaches  Patient came to the ED for further evaluation  In the ED patient does not have any sensation of paresthesias  Patient denies any focal neuro deficits  History provided by:  Patient      Prior to Admission Medications   Prescriptions Last Dose Informant Patient Reported? Taking?    albuterol (2 5 mg/3 mL) 0 083 % nebulizer solution Unknown at Unknown time  No No   Sig: Take 1 vial (2 5 mg total) by nebulization every 6 (six) hours as needed for wheezing or shortness of breath   albuterol (ACCUNEB) 1 25 MG/3ML nebulizer solution Unknown at Unknown time  No No   Sig: Take 3 mL (1 25 mg total) by nebulization every 6 (six) hours as needed for wheezing   albuterol (PROVENTIL HFA,VENTOLIN HFA) 90 mcg/act inhaler Past Week at Unknown time  No Yes   Sig: INHALE 2 PUFFS EVERY 4 (FOUR) HOURS AS NEEDED FOR WHEEZING OR SHORTNESS OF BREATH   hydrOXYzine HCL (ATARAX) 25 mg tablet Unknown at Unknown time  Yes No   Sig: TAKE 1 TABLET (25 MG TOTAL) BY MOUTH EVERY 6 (SIX) HOURS AS NEEDED FOR ANXIETY  montelukast (SINGULAIR) 10 mg tablet Unknown at Unknown time  No No   Sig: TAKE 1 TABLET (10 MG TOTAL) BY MOUTH DAILY AT BEDTIME      Facility-Administered Medications: None       Past Medical History:   Diagnosis Date    Asthma     Concussion        History reviewed  No pertinent surgical history  Family History   Problem Relation Age of Onset    Heart disease Father     Asthma Brother     Cancer Maternal Grandmother     Diabetes Maternal Grandmother      I have reviewed and agree with the history as documented  E-Cigarette/Vaping    E-Cigarette Use Never User      E-Cigarette/Vaping Substances     Social History     Tobacco Use    Smoking status: Former Smoker     Packs/day: 0 50     Years: 5 00     Pack years: 2 50     Types: Cigarettes     Quit date: 01/2018     Years since quitting: 3 5    Smokeless tobacco: Never Used   Vaping Use    Vaping Use: Never used   Substance Use Topics    Alcohol use: No    Drug use: No       Review of Systems   Constitutional: Negative for activity change, appetite change, chills and fever  HENT: Negative for congestion and ear pain  Eyes: Negative for pain and discharge  Respiratory: Negative for cough, chest tightness, shortness of breath, wheezing and stridor  Cardiovascular: Negative for chest pain and palpitations  Gastrointestinal: Negative for abdominal distention, abdominal pain, constipation, diarrhea and nausea  Endocrine: Negative for cold intolerance  Genitourinary: Negative for dysuria, frequency and urgency  Musculoskeletal: Negative for arthralgias and back pain  Skin: Negative for color change and rash  Allergic/Immunologic: Negative for environmental allergies and food allergies     Neurological: Negative for dizziness, weakness, numbness and headaches  Paresthesia   Hematological: Negative for adenopathy  Psychiatric/Behavioral: Negative for agitation, behavioral problems and confusion  The patient is not nervous/anxious  All other systems reviewed and are negative  Physical Exam  Physical Exam  Vitals and nursing note reviewed  Constitutional:       Appearance: She is well-developed  HENT:      Head: Normocephalic and atraumatic  Eyes:      Extraocular Movements: Extraocular movements intact  Conjunctiva/sclera: Conjunctivae normal       Pupils: Pupils are equal, round, and reactive to light  Comments: Very mild photophobia noted on exam    Cardiovascular:      Rate and Rhythm: Normal rate and regular rhythm  Heart sounds: Normal heart sounds  Pulmonary:      Effort: Pulmonary effort is normal       Breath sounds: Normal breath sounds  Abdominal:      General: Bowel sounds are normal  There is no distension  Palpations: Abdomen is soft  Tenderness: There is no abdominal tenderness  Musculoskeletal:         General: Normal range of motion  Cervical back: Normal range of motion and neck supple  Skin:     General: Skin is warm and dry  Neurological:      General: No focal deficit present  Mental Status: She is alert and oriented to person, place, and time  Comments: Patient is alert and oriented x3  Visual field intact bilateral   Finger-to-nose intact bilateral   Heel-to-shin intact bilateral   Sensory and motor strength intact bilateral   No focal neuro deficits noted  Psychiatric:         Behavior: Behavior normal          Thought Content:  Thought content normal          Judgment: Judgment normal          Vital Signs  ED Triage Vitals   Temperature Pulse Respirations Blood Pressure SpO2   07/12/21 0621 07/12/21 0621 07/12/21 0621 07/12/21 0621 07/12/21 0621   (!) 96 6 °F (35 9 °C) 82 18 144/69 98 %      Temp Source Heart Rate Source Patient Position - Orthostatic VS BP Location FiO2 (%)   07/12/21 0621 -- 07/12/21 0621 07/12/21 0621 --   Tympanic  Sitting Right arm       Pain Score       07/12/21 0623       6           Vitals:    07/12/21 0621   BP: 144/69   Pulse: 82   Patient Position - Orthostatic VS: Sitting         Visual Acuity      ED Medications  Medications   sodium chloride 0 9 % bolus 1,000 mL (1,000 mL Intravenous New Bag 7/12/21 0651)   metoclopramide (REGLAN) injection 10 mg (10 mg Intravenous Given 7/12/21 0650)   ketorolac (TORADOL) injection 15 mg (15 mg Intravenous Given 7/12/21 0650)   diphenhydrAMINE (BENADRYL) injection 50 mg (50 mg Intravenous Given 7/12/21 0650)       Diagnostic Studies  Results Reviewed     Procedure Component Value Units Date/Time    CBC and differential [[id number]] Collected: 07/12/21 0704    Lab Status: In process Specimen: Blood from Arm, Right Updated: 07/12/21 0707    Troponin I [[id number]] Collected: 07/12/21 0704    Lab Status: In process Specimen: Blood from Arm, Right Updated: 07/12/21 0707    Comprehensive metabolic panel [[id number]] Collected: 07/12/21 0704    Lab Status: In process Specimen: Blood from Arm, Right Updated: 07/12/21 0707    Magnesium [[id number]] Collected: 07/12/21 0704    Lab Status: In process Specimen: Blood from Arm, Right Updated: 07/12/21 0707    TSH, 3rd generation with Free T4 reflex [[id number]] Collected: 07/12/21 0704    Lab Status: In process Specimen: Blood from Arm, Right Updated: 07/12/21 Ivy Course [[id number]] Collected: 07/12/21 0704    Lab Status: In process Specimen: Blood from Arm, Right Updated: 07/12/21 0707    APTT [[id number]] Collected: 07/12/21 0704    Lab Status:  In process Specimen: Blood from Arm, Right Updated: 07/12/21 0707    POCT pregnancy, urine [[id number]]     Lab Status: No result     UA w Reflex to Microscopic w Reflex to Culture [[id number]]     Lab Status: No result Specimen: Urine     Rapid drug screen, urine [[id number]]     Lab Status: No result Specimen: Urine                  XR chest 1 view portable    (Results Pending)   CTA head and neck with and without contrast    (Results Pending)              Procedures  ECG 12 Lead Documentation Only    Date/Time: 7/12/2021 6:50 AM  Performed by: Mary Collado DO  Authorized by: Mary Collado DO     Indications / Diagnosis:  Syncope  ECG reviewed by me, the ED Provider: yes    Patient location:  ED  Previous ECG:     Previous ECG:  Compared to current    Similarity:  Changes noted    Comparison to cardiac monitor: Yes    Interpretation:     Interpretation: non-specific    Comments:      Sinus rhythm, rate 96, normal axis, normal intervals, no acute ST elevations noted, T-wave flattening noted throughout suggesting nonspecific T-wave abnormalities, low amplitude to use are new compared to previous EKG  ED Course                             SBIRT 22yo+      Most Recent Value   SBIRT (22 yo +)   In order to provide better care to our patients, we are screening all of our patients for alcohol and drug use  Would it be okay to ask you these screening questions? Yes Filed at: 07/12/2021 4660   Initial Alcohol Screen: US AUDIT-C    1  How often do you have a drink containing alcohol?  0 Filed at: 07/12/2021 0628   2  How many drinks containing alcohol do you have on a typical day you are drinking? 0 Filed at: 07/12/2021 0628   3b  FEMALE Any Age, or MALE 65+: How often do you have 4 or more drinks on one occassion? 0 Filed at: 07/12/2021 5487   Audit-C Score  0 Filed at: 07/12/2021 3083   MARKO: How many times in the past year have you    Used an illegal drug or used a prescription medication for non-medical reasons?   Never Filed at: 07/12/2021 0590                    MDM  Number of Diagnoses or Management Options  Headache: new and requires workup  Paresthesia: new and requires workup  Syncope: new and requires workup  Diagnosis management comments: Obtain blood work, UA, CTA head/neck  Give IV fluids, migraine cocktail and reassess         Amount and/or Complexity of Data Reviewed  Clinical lab tests: ordered  Tests in the radiology section of CPT®: ordered  Tests in the medicine section of CPT®: ordered  Review and summarize past medical records: yes  Independent visualization of images, tracings, or specimens: yes    Risk of Complications, Morbidity, and/or Mortality  General comments: Eleven radiology results are pending  Care patient's Centers of the next attending who will review lab and radiology results and then reassess patient after medications are given  He patient will then be dispositioned appropriately  Patient Progress  Patient progress: stable      Disposition  Final diagnoses:   Syncope   Paresthesia   Headache     Time reflects when diagnosis was documented in both MDM as applicable and the Disposition within this note     Time User Action Codes Description Comment    7/12/2021  6:55 AM Albania Herrera Add [R55] Syncope     7/12/2021  6:55 AM [place] Herrera Add [R20 2] Paresthesia     7/12/2021  7:07 AM Yadira Barragan Add [R51 9] Headache       ED Disposition     None      Follow-up Information    None         Patient's Medications   Discharge Prescriptions    No medications on file     No discharge procedures on file      PDMP Review       Value Time User    PDMP Reviewed  Yes 1/25/2021  2:24 AM Judah Garcia MD          ED Provider  Electronically Signed by           Tessa Randall DO  07/12/21 4426

## 2021-07-12 NOTE — ED NOTES
ORTHOSTATIC VITAL SIGNS BLOOD PRESSURE HEART RATE           LYING 108/59 76           SITTING 110 64 68           STANDING 105/73 2480 Juan Hernandez RN  07/12/21 Malissa 96, RN  07/12/21 5287

## 2021-07-14 LAB
ATRIAL RATE: 96 BPM
P AXIS: 45 DEGREES
PR INTERVAL: 126 MS
QRS AXIS: 54 DEGREES
QRSD INTERVAL: 88 MS
QT INTERVAL: 210 MS
QTC INTERVAL: 265 MS
T WAVE AXIS: 270 DEGREES
VENTRICULAR RATE: 96 BPM

## 2021-07-14 PROCEDURE — 93010 ELECTROCARDIOGRAM REPORT: CPT | Performed by: INTERNAL MEDICINE

## 2021-08-02 ENCOUNTER — TELEPHONE (OUTPATIENT)
Dept: FAMILY MEDICINE | Age: 31
End: 2021-08-02

## 2021-08-10 ENCOUNTER — E-ADVICE (OUTPATIENT)
Dept: FAMILY MEDICINE | Age: 31
End: 2021-08-10

## 2021-08-11 ENCOUNTER — OFFICE VISIT (OUTPATIENT)
Dept: FAMILY MEDICINE | Age: 31
End: 2021-08-11

## 2021-08-11 ENCOUNTER — NURSE TRIAGE (OUTPATIENT)
Dept: TELEHEALTH | Age: 31
End: 2021-08-11

## 2021-08-11 VITALS
HEIGHT: 62 IN | SYSTOLIC BLOOD PRESSURE: 108 MMHG | BODY MASS INDEX: 36.07 KG/M2 | RESPIRATION RATE: 16 BRPM | DIASTOLIC BLOOD PRESSURE: 68 MMHG | HEART RATE: 76 BPM | WEIGHT: 196 LBS | TEMPERATURE: 98.2 F

## 2021-08-11 DIAGNOSIS — R09.1 PLEURISY: Primary | ICD-10-CM

## 2021-08-11 DIAGNOSIS — R07.89 OTHER CHEST PAIN: ICD-10-CM

## 2021-08-11 PROCEDURE — 93000 ELECTROCARDIOGRAM COMPLETE: CPT | Performed by: NURSE PRACTITIONER

## 2021-08-11 PROCEDURE — 99213 OFFICE O/P EST LOW 20 MIN: CPT | Performed by: NURSE PRACTITIONER

## 2021-08-11 RX ORDER — PREDNISONE 20 MG/1
TABLET ORAL
Qty: 15 TABLET | Refills: 0 | Status: SHIPPED | OUTPATIENT
Start: 2021-08-11 | End: 2021-12-30 | Stop reason: ALTCHOICE

## 2021-08-11 RX ORDER — TRAMADOL HYDROCHLORIDE 50 MG/1
50 TABLET ORAL EVERY 6 HOURS PRN
Qty: 28 TABLET | Refills: 0 | Status: SHIPPED | OUTPATIENT
Start: 2021-08-11 | End: 2021-08-18

## 2021-08-25 ENCOUNTER — LAB SERVICES (OUTPATIENT)
Dept: FAMILY MEDICINE | Age: 31
End: 2021-08-25

## 2021-08-25 DIAGNOSIS — E78.1 HYPERTRIGLYCERIDEMIA: ICD-10-CM

## 2021-08-25 DIAGNOSIS — E55.9 VITAMIN D DEFICIENCY: ICD-10-CM

## 2021-08-25 PROCEDURE — 80061 LIPID PANEL: CPT | Performed by: INTERNAL MEDICINE

## 2021-08-25 PROCEDURE — 82306 VITAMIN D 25 HYDROXY: CPT | Performed by: INTERNAL MEDICINE

## 2021-08-25 PROCEDURE — 36415 COLL VENOUS BLD VENIPUNCTURE: CPT | Performed by: NURSE PRACTITIONER

## 2021-08-26 LAB
25(OH)D3+25(OH)D2 SERPL-MCNC: 29.9 NG/ML (ref 30–100)
CHOLEST SERPL-MCNC: 193 MG/DL
CHOLEST/HDLC SERPL: 4.9 {RATIO}
FASTING DURATION TIME PATIENT: 12 HOURS (ref 0–999)
HDLC SERPL-MCNC: 39 MG/DL
LDLC SERPL CALC-MCNC: 105 MG/DL
NONHDLC SERPL-MCNC: 154 MG/DL
TRIGL SERPL-MCNC: 243 MG/DL

## 2021-09-06 DIAGNOSIS — E55.9 VITAMIN D DEFICIENCY: ICD-10-CM

## 2021-09-06 DIAGNOSIS — Z00.00 ANNUAL PHYSICAL EXAM: Primary | ICD-10-CM

## 2021-09-06 DIAGNOSIS — E78.1 HYPERTRIGLYCERIDEMIA: ICD-10-CM

## 2021-09-07 ENCOUNTER — TELEPHONE (OUTPATIENT)
Dept: FAMILY MEDICINE | Age: 31
End: 2021-09-07

## 2021-09-07 RX ORDER — ERGOCALCIFEROL 1.25 MG/1
CAPSULE ORAL
Qty: 12 CAPSULE | Refills: 3 | OUTPATIENT
Start: 2021-09-07

## 2021-12-30 ENCOUNTER — OFFICE VISIT (OUTPATIENT)
Dept: FAMILY MEDICINE | Age: 31
End: 2021-12-30

## 2021-12-30 VITALS
BODY MASS INDEX: 35.76 KG/M2 | DIASTOLIC BLOOD PRESSURE: 82 MMHG | WEIGHT: 195.5 LBS | TEMPERATURE: 98.7 F | HEART RATE: 92 BPM | SYSTOLIC BLOOD PRESSURE: 118 MMHG

## 2021-12-30 DIAGNOSIS — E78.1 HYPERTRIGLYCERIDEMIA: ICD-10-CM

## 2021-12-30 DIAGNOSIS — Z00.00 ANNUAL PHYSICAL EXAM: Primary | ICD-10-CM

## 2021-12-30 DIAGNOSIS — L73.9 FOLLICULITIS: ICD-10-CM

## 2021-12-30 DIAGNOSIS — D23.9 DERMATOFIBROMA: ICD-10-CM

## 2021-12-30 DIAGNOSIS — E55.9 VITAMIN D DEFICIENCY: ICD-10-CM

## 2021-12-30 PROCEDURE — 99395 PREV VISIT EST AGE 18-39: CPT | Performed by: NURSE PRACTITIONER

## 2021-12-30 ASSESSMENT — ANXIETY QUESTIONNAIRES
GAD7 TOTAL SCORE: 1
1. FEELING NERVOUS, ANXIOUS, OR ON EDGE: 0
6. BECOMING EASILY ANNOYED OR IRRITABLE: 1
2. NOT BEING ABLE TO STOP OR CONTROL WORRYING: NOT AT ALL
5. BEING SO RESTLESS THAT IT IS HARD TO SIT STILL: 0
6. BECOMING EASILY ANNOYED OR IRRITABLE: SEVERAL DAYS
2. NOT BEING ABLE TO STOP OR CONTROL WORRYING: 0
4. TROUBLE RELAXING: NOT AT ALL
4. TROUBLE RELAXING: 0
7. FEELING AFRAID AS IF SOMETHING AWFUL MIGHT HAPPEN: 0
1. FEELING NERVOUS, ANXIOUS, OR ON EDGE: NOT AT ALL
3. WORRYING TOO MUCH ABOUT DIFFERENT THINGS: NOT AT ALL
3. WORRYING TOO MUCH ABOUT DIFFERENT THINGS: 0
5. BEING SO RESTLESS THAT IT IS HARD TO SIT STILL: NOT AT ALL
7. FEELING AFRAID AS IF SOMETHING AWFUL MIGHT HAPPEN: NOT AT ALL
IF YOU CHECKED OFF ANY PROBLEMS ON THIS QUESTIONNAIRE, HOW DIFFICULT HAVE THESE PROBLEMS MADE IT FOR YOU TO DO YOUR WORK, TAKE CARE OF THINGS AT HOME, OR GET ALONG WITH OTHER PEOPLE: NOT DIFFICULT AT ALL

## 2021-12-30 ASSESSMENT — PATIENT HEALTH QUESTIONNAIRE - PHQ9
7. TROUBLE CONCENTRATING ON THINGS, SUCH AS READING THE NEWSPAPER OR WATCHING TELEVISION: NOT AT ALL
5. POOR APPETITE, WEIGHT LOSS, OR OVEREATING: NOT AT ALL
SUM OF ALL RESPONSES TO PHQ9 QUESTIONS 1 AND 2: 2
10. IF YOU CHECKED OFF ANY PROBLEMS, HOW DIFFICULT HAVE THESE PROBLEMS MADE IT FOR YOU TO DO YOUR WORK, TAKE CARE OF THINGS AT HOME, OR GET ALONG WITH OTHER PEOPLE: SOMEWHAT DIFFICULT
SUM OF ALL RESPONSES TO PHQ9 QUESTIONS 1 AND 2: 2
SUM OF ALL RESPONSES TO PHQ QUESTIONS 1-9: 7
CLINICAL INTERPRETATION OF PHQ9 SCORE: MILD DEPRESSION
4. FEELING TIRED OR HAVING LITTLE ENERGY: MORE THAN HALF THE DAYS
3. TROUBLE FALLING OR STAYING ASLEEP OR SLEEPING TOO MUCH: NEARLY EVERY DAY
9. THOUGHTS THAT YOU WOULD BE BETTER OFF DEAD, OR OF HURTING YOURSELF: NOT AT ALL
6. FEELING BAD ABOUT YOURSELF - OR THAT YOU ARE A FAILURE OR HAVE LET YOURSELF OR YOUR FAMILY DOWN: NOT AT ALL
CLINICAL INTERPRETATION OF PHQ2 SCORE: NO FURTHER SCREENING NEEDED
1. LITTLE INTEREST OR PLEASURE IN DOING THINGS: SEVERAL DAYS
2. FEELING DOWN, DEPRESSED OR HOPELESS: SEVERAL DAYS
8. MOVING OR SPEAKING SO SLOWLY THAT OTHER PEOPLE COULD HAVE NOTICED. OR THE OPPOSITE, BEING SO FIGETY OR RESTLESS THAT YOU HAVE BEEN MOVING AROUND A LOT MORE THAN USUAL: NOT AT ALL

## 2022-01-10 ENCOUNTER — LAB SERVICES (OUTPATIENT)
Dept: FAMILY MEDICINE | Age: 32
End: 2022-01-10

## 2022-01-10 DIAGNOSIS — E78.1 HYPERTRIGLYCERIDEMIA: ICD-10-CM

## 2022-01-10 DIAGNOSIS — Z00.00 ANNUAL PHYSICAL EXAM: ICD-10-CM

## 2022-01-10 DIAGNOSIS — E55.9 VITAMIN D DEFICIENCY: ICD-10-CM

## 2022-01-10 PROCEDURE — 82306 VITAMIN D 25 HYDROXY: CPT | Performed by: INTERNAL MEDICINE

## 2022-01-10 PROCEDURE — 80061 LIPID PANEL: CPT | Performed by: INTERNAL MEDICINE

## 2022-01-10 PROCEDURE — 36415 COLL VENOUS BLD VENIPUNCTURE: CPT | Performed by: NURSE PRACTITIONER

## 2022-01-10 PROCEDURE — 80050 GENERAL HEALTH PANEL: CPT | Performed by: INTERNAL MEDICINE

## 2022-01-11 LAB
25(OH)D3+25(OH)D2 SERPL-MCNC: 17.8 NG/ML (ref 30–100)
ALBUMIN SERPL-MCNC: 4.2 G/DL (ref 3.6–5.1)
ALBUMIN/GLOB SERPL: 1.6 {RATIO} (ref 1–2.4)
ALP SERPL-CCNC: 49 UNITS/L (ref 45–117)
ALT SERPL-CCNC: 53 UNITS/L
ANION GAP SERPL CALC-SCNC: 10 MMOL/L (ref 10–20)
AST SERPL-CCNC: 19 UNITS/L
BASOPHILS # BLD: 0 K/MCL (ref 0–0.3)
BASOPHILS NFR BLD: 1 %
BILIRUB SERPL-MCNC: 0.4 MG/DL (ref 0.2–1)
BUN SERPL-MCNC: 10 MG/DL (ref 6–20)
BUN/CREAT SERPL: 13 (ref 7–25)
CALCIUM SERPL-MCNC: 9.2 MG/DL (ref 8.4–10.2)
CHLORIDE SERPL-SCNC: 107 MMOL/L (ref 98–107)
CHOLEST SERPL-MCNC: 190 MG/DL
CHOLEST/HDLC SERPL: 5.4 {RATIO}
CO2 SERPL-SCNC: 28 MMOL/L (ref 21–32)
CREAT SERPL-MCNC: 0.79 MG/DL (ref 0.51–0.95)
DEPRECATED RDW RBC: 42.4 FL (ref 39–50)
EOSINOPHIL # BLD: 0.1 K/MCL (ref 0–0.5)
EOSINOPHIL NFR BLD: 1 %
ERYTHROCYTE [DISTWIDTH] IN BLOOD: 12.5 % (ref 11–15)
FASTING DURATION TIME PATIENT: 12 HOURS (ref 0–999)
FASTING DURATION TIME PATIENT: 12 HOURS (ref 0–999)
GFR SERPLBLD BASED ON 1.73 SQ M-ARVRAT: >90 ML/MIN
GLOBULIN SER-MCNC: 2.6 G/DL (ref 2–4)
GLUCOSE SERPL-MCNC: 109 MG/DL (ref 70–99)
HCT VFR BLD CALC: 43 % (ref 36–46.5)
HDLC SERPL-MCNC: 35 MG/DL
HGB BLD-MCNC: 14.3 G/DL (ref 12–15.5)
IMM GRANULOCYTES # BLD AUTO: 0 K/MCL (ref 0–0.2)
IMM GRANULOCYTES # BLD: 0 %
LDLC SERPL CALC-MCNC: 106 MG/DL
LYMPHOCYTES # BLD: 2.4 K/MCL (ref 1–4.8)
LYMPHOCYTES NFR BLD: 27 %
MCH RBC QN AUTO: 31 PG (ref 26–34)
MCHC RBC AUTO-ENTMCNC: 33.3 G/DL (ref 32–36.5)
MCV RBC AUTO: 93.3 FL (ref 78–100)
MONOCYTES # BLD: 0.8 K/MCL (ref 0.3–0.9)
MONOCYTES NFR BLD: 9 %
NEUTROPHILS # BLD: 5.5 K/MCL (ref 1.8–7.7)
NEUTROPHILS NFR BLD: 62 %
NONHDLC SERPL-MCNC: 155 MG/DL
NRBC BLD MANUAL-RTO: 0 /100 WBC
PLATELET # BLD AUTO: 305 K/MCL (ref 140–450)
POTASSIUM SERPL-SCNC: 4.5 MMOL/L (ref 3.4–5.1)
PROT SERPL-MCNC: 6.8 G/DL (ref 6.4–8.2)
RBC # BLD: 4.61 MIL/MCL (ref 4–5.2)
SODIUM SERPL-SCNC: 140 MMOL/L (ref 135–145)
TRIGL SERPL-MCNC: 243 MG/DL
TSH SERPL-ACNC: 1.72 MCUNITS/ML (ref 0.35–5)
WBC # BLD: 8.9 K/MCL (ref 4.2–11)

## 2022-01-20 ENCOUNTER — E-ADVICE (OUTPATIENT)
Dept: FAMILY MEDICINE | Age: 32
End: 2022-01-20

## 2022-01-20 DIAGNOSIS — Z13.220 SCREENING FOR HYPERLIPIDEMIA: ICD-10-CM

## 2022-01-20 DIAGNOSIS — E55.9 VITAMIN D DEFICIENCY: ICD-10-CM

## 2022-01-20 DIAGNOSIS — E78.1 HYPERTRIGLYCERIDEMIA: Primary | ICD-10-CM

## 2022-01-24 RX ORDER — FENOFIBRATE 145 MG/1
145 TABLET, COATED ORAL DAILY
Qty: 90 TABLET | Refills: 0 | Status: SHIPPED | OUTPATIENT
Start: 2022-01-24

## 2022-01-24 RX ORDER — FENOFIBRATE 145 MG/1
145 TABLET, COATED ORAL DAILY
Qty: 90 TABLET | Refills: 0 | Status: SHIPPED | OUTPATIENT
Start: 2022-01-24 | End: 2022-01-24 | Stop reason: SDUPTHER

## 2022-06-28 ENCOUNTER — HOSPITAL ENCOUNTER (EMERGENCY)
Facility: HOSPITAL | Age: 32
End: 2022-06-29
Attending: EMERGENCY MEDICINE | Admitting: EMERGENCY MEDICINE
Payer: COMMERCIAL

## 2022-06-28 DIAGNOSIS — T50.901A ACCIDENTAL DRUG OVERDOSE, INITIAL ENCOUNTER: Primary | ICD-10-CM

## 2022-06-28 DIAGNOSIS — E83.39 HYPOPHOSPHATEMIA: ICD-10-CM

## 2022-06-28 DIAGNOSIS — E87.6 HYPOKALEMIA: ICD-10-CM

## 2022-06-28 DIAGNOSIS — E83.42 HYPOMAGNESEMIA: ICD-10-CM

## 2022-06-28 DIAGNOSIS — R45.1 AGITATION: ICD-10-CM

## 2022-06-28 DIAGNOSIS — R41.82 ALTERED MENTAL STATUS, UNSPECIFIED ALTERED MENTAL STATUS TYPE: ICD-10-CM

## 2022-06-28 LAB
ALBUMIN SERPL BCP-MCNC: 4 G/DL (ref 3.5–5)
ALP SERPL-CCNC: 59 U/L (ref 34–104)
ALT SERPL W P-5'-P-CCNC: 23 U/L (ref 7–52)
AMMONIA PLAS-SCNC: 43 UMOL/L (ref 18–72)
AMPHETAMINES SERPL QL SCN: NEGATIVE
ANION GAP SERPL CALCULATED.3IONS-SCNC: 14 MMOL/L (ref 4–13)
APAP SERPL-MCNC: <10 UG/ML (ref 10–20)
AST SERPL W P-5'-P-CCNC: 22 U/L (ref 13–39)
BARBITURATES UR QL: NEGATIVE
BASOPHILS # BLD AUTO: 0.04 THOUSANDS/ΜL (ref 0–0.1)
BASOPHILS NFR BLD AUTO: 0 % (ref 0–1)
BENZODIAZ UR QL: POSITIVE
BILIRUB DIRECT SERPL-MCNC: 0.09 MG/DL (ref 0–0.2)
BILIRUB SERPL-MCNC: 0.39 MG/DL (ref 0.2–1)
BILIRUB UR QL STRIP: NEGATIVE
BUN SERPL-MCNC: 9 MG/DL (ref 5–25)
CALCIUM SERPL-MCNC: 9 MG/DL (ref 8.4–10.2)
CHLORIDE SERPL-SCNC: 102 MMOL/L (ref 96–108)
CLARITY UR: CLEAR
CO2 SERPL-SCNC: 17 MMOL/L (ref 21–32)
COCAINE UR QL: NEGATIVE
COLOR UR: YELLOW
CREAT SERPL-MCNC: 0.84 MG/DL (ref 0.6–1.3)
EOSINOPHIL # BLD AUTO: 0.14 THOUSAND/ΜL (ref 0–0.61)
EOSINOPHIL NFR BLD AUTO: 1 % (ref 0–6)
ERYTHROCYTE [DISTWIDTH] IN BLOOD BY AUTOMATED COUNT: 11.9 % (ref 11.6–15.1)
ETHANOL SERPL-MCNC: <10 MG/DL
EXT PREG TEST URINE: NEGATIVE
EXT. CONTROL ED NAV: NORMAL
GFR SERPL CREATININE-BSD FRML MDRD: 91 ML/MIN/1.73SQ M
GLUCOSE SERPL-MCNC: 119 MG/DL (ref 65–140)
GLUCOSE SERPL-MCNC: 126 MG/DL (ref 65–140)
GLUCOSE UR STRIP-MCNC: NEGATIVE MG/DL
HCT VFR BLD AUTO: 43 % (ref 34.8–46.1)
HGB BLD-MCNC: 15 G/DL (ref 11.5–15.4)
HGB UR QL STRIP.AUTO: NEGATIVE
IMM GRANULOCYTES # BLD AUTO: 0.02 THOUSAND/UL (ref 0–0.2)
IMM GRANULOCYTES NFR BLD AUTO: 0 % (ref 0–2)
KETONES UR STRIP-MCNC: ABNORMAL MG/DL
LACTATE SERPL-SCNC: 2.6 MMOL/L (ref 0.5–2)
LEUKOCYTE ESTERASE UR QL STRIP: NEGATIVE
LYMPHOCYTES # BLD AUTO: 3.71 THOUSANDS/ΜL (ref 0.6–4.47)
LYMPHOCYTES NFR BLD AUTO: 38 % (ref 14–44)
MAGNESIUM SERPL-MCNC: 1.5 MG/DL (ref 1.9–2.7)
MCH RBC QN AUTO: 29 PG (ref 26.8–34.3)
MCHC RBC AUTO-ENTMCNC: 34.9 G/DL (ref 31.4–37.4)
MCV RBC AUTO: 83 FL (ref 82–98)
METHADONE UR QL: POSITIVE
MONOCYTES # BLD AUTO: 1.03 THOUSAND/ΜL (ref 0.17–1.22)
MONOCYTES NFR BLD AUTO: 11 % (ref 4–12)
NEUTROPHILS # BLD AUTO: 4.9 THOUSANDS/ΜL (ref 1.85–7.62)
NEUTS SEG NFR BLD AUTO: 50 % (ref 43–75)
NITRITE UR QL STRIP: NEGATIVE
NRBC BLD AUTO-RTO: 0 /100 WBCS
OPIATES UR QL SCN: NEGATIVE
OXYCODONE+OXYMORPHONE UR QL SCN: NEGATIVE
PCP UR QL: NEGATIVE
PH UR STRIP.AUTO: 7.5 [PH]
PHOSPHATE SERPL-MCNC: <1 MG/DL (ref 2.7–4.5)
PLATELET # BLD AUTO: 307 THOUSANDS/UL (ref 149–390)
PMV BLD AUTO: 10.9 FL (ref 8.9–12.7)
POTASSIUM SERPL-SCNC: 2.6 MMOL/L (ref 3.5–5.3)
PROT SERPL-MCNC: 6.6 G/DL (ref 6.4–8.4)
PROT UR STRIP-MCNC: NEGATIVE MG/DL
RBC # BLD AUTO: 5.17 MILLION/UL (ref 3.81–5.12)
SALICYLATES SERPL-MCNC: <5 MG/DL (ref 3–20)
SODIUM SERPL-SCNC: 133 MMOL/L (ref 135–147)
SP GR UR STRIP.AUTO: 1.01 (ref 1–1.03)
THC UR QL: NEGATIVE
TSH SERPL DL<=0.05 MIU/L-ACNC: 9.38 UIU/ML (ref 0.45–4.5)
UROBILINOGEN UR QL STRIP.AUTO: 0.2 E.U./DL
WBC # BLD AUTO: 9.84 THOUSAND/UL (ref 4.31–10.16)

## 2022-06-28 PROCEDURE — 81003 URINALYSIS AUTO W/O SCOPE: CPT | Performed by: EMERGENCY MEDICINE

## 2022-06-28 PROCEDURE — 99285 EMERGENCY DEPT VISIT HI MDM: CPT | Performed by: EMERGENCY MEDICINE

## 2022-06-28 PROCEDURE — 80053 COMPREHEN METABOLIC PANEL: CPT | Performed by: EMERGENCY MEDICINE

## 2022-06-28 PROCEDURE — 84100 ASSAY OF PHOSPHORUS: CPT | Performed by: EMERGENCY MEDICINE

## 2022-06-28 PROCEDURE — 83735 ASSAY OF MAGNESIUM: CPT | Performed by: EMERGENCY MEDICINE

## 2022-06-28 PROCEDURE — 82077 ASSAY SPEC XCP UR&BREATH IA: CPT | Performed by: EMERGENCY MEDICINE

## 2022-06-28 PROCEDURE — 82248 BILIRUBIN DIRECT: CPT | Performed by: EMERGENCY MEDICINE

## 2022-06-28 PROCEDURE — 80307 DRUG TEST PRSMV CHEM ANLYZR: CPT | Performed by: EMERGENCY MEDICINE

## 2022-06-28 PROCEDURE — 82800 BLOOD PH: CPT | Performed by: EMERGENCY MEDICINE

## 2022-06-28 PROCEDURE — 80179 DRUG ASSAY SALICYLATE: CPT | Performed by: EMERGENCY MEDICINE

## 2022-06-28 PROCEDURE — 82140 ASSAY OF AMMONIA: CPT | Performed by: EMERGENCY MEDICINE

## 2022-06-28 PROCEDURE — 81025 URINE PREGNANCY TEST: CPT | Performed by: EMERGENCY MEDICINE

## 2022-06-28 PROCEDURE — 85025 COMPLETE CBC W/AUTO DIFF WBC: CPT | Performed by: EMERGENCY MEDICINE

## 2022-06-28 PROCEDURE — 83605 ASSAY OF LACTIC ACID: CPT | Performed by: EMERGENCY MEDICINE

## 2022-06-28 PROCEDURE — 84443 ASSAY THYROID STIM HORMONE: CPT | Performed by: EMERGENCY MEDICINE

## 2022-06-28 PROCEDURE — 99285 EMERGENCY DEPT VISIT HI MDM: CPT

## 2022-06-28 PROCEDURE — 93005 ELECTROCARDIOGRAM TRACING: CPT

## 2022-06-28 PROCEDURE — 82948 REAGENT STRIP/BLOOD GLUCOSE: CPT

## 2022-06-28 PROCEDURE — 36415 COLL VENOUS BLD VENIPUNCTURE: CPT | Performed by: EMERGENCY MEDICINE

## 2022-06-28 PROCEDURE — 96361 HYDRATE IV INFUSION ADD-ON: CPT

## 2022-06-28 PROCEDURE — 80143 DRUG ASSAY ACETAMINOPHEN: CPT | Performed by: EMERGENCY MEDICINE

## 2022-06-28 RX ORDER — NALOXONE HYDROCHLORIDE 0.4 MG/ML
0.1 INJECTION, SOLUTION INTRAMUSCULAR; INTRAVENOUS; SUBCUTANEOUS ONCE
Status: DISCONTINUED | OUTPATIENT
Start: 2022-06-28 | End: 2022-06-29 | Stop reason: HOSPADM

## 2022-06-28 RX ORDER — MAGNESIUM SULFATE HEPTAHYDRATE 40 MG/ML
2 INJECTION, SOLUTION INTRAVENOUS ONCE
Status: COMPLETED | OUTPATIENT
Start: 2022-06-29 | End: 2022-06-29

## 2022-06-28 RX ORDER — NALOXONE HYDROCHLORIDE 1 MG/ML
1 INJECTION PARENTERAL ONCE
Status: COMPLETED | OUTPATIENT
Start: 2022-06-28 | End: 2022-06-28

## 2022-06-28 RX ADMIN — SODIUM CHLORIDE 1000 ML: 0.9 INJECTION, SOLUTION INTRAVENOUS at 23:00

## 2022-06-29 ENCOUNTER — HOSPITAL ENCOUNTER (INPATIENT)
Facility: HOSPITAL | Age: 32
LOS: 1 days | Discharge: HOME | DRG: 917 | End: 2022-06-29
Attending: INTERNAL MEDICINE | Admitting: INTERNAL MEDICINE
Payer: COMMERCIAL

## 2022-06-29 ENCOUNTER — APPOINTMENT (EMERGENCY)
Dept: RADIOLOGY | Facility: HOSPITAL | Age: 32
End: 2022-06-29
Payer: COMMERCIAL

## 2022-06-29 VITALS
TEMPERATURE: 98.6 F | RESPIRATION RATE: 19 BRPM | DIASTOLIC BLOOD PRESSURE: 59 MMHG | HEART RATE: 85 BPM | SYSTOLIC BLOOD PRESSURE: 110 MMHG | WEIGHT: 165.57 LBS | HEIGHT: 63 IN | OXYGEN SATURATION: 97 % | BODY MASS INDEX: 29.34 KG/M2

## 2022-06-29 VITALS
RESPIRATION RATE: 24 BRPM | DIASTOLIC BLOOD PRESSURE: 53 MMHG | OXYGEN SATURATION: 97 % | TEMPERATURE: 97.7 F | HEIGHT: 63 IN | HEART RATE: 77 BPM | WEIGHT: 125 LBS | BODY MASS INDEX: 22.15 KG/M2 | SYSTOLIC BLOOD PRESSURE: 108 MMHG

## 2022-06-29 DIAGNOSIS — J45.41 MODERATE PERSISTENT ASTHMA WITH EXACERBATION: ICD-10-CM

## 2022-06-29 DIAGNOSIS — T50.901A ACCIDENTAL OVERDOSE: Primary | ICD-10-CM

## 2022-06-29 PROBLEM — G92.9 TOXIC ENCEPHALOPATHY: Status: ACTIVE | Noted: 2022-06-29

## 2022-06-29 PROBLEM — E87.6 HYPOKALEMIA: Status: ACTIVE | Noted: 2022-06-29

## 2022-06-29 PROBLEM — F41.9 ANXIETY: Status: ACTIVE | Noted: 2022-06-29

## 2022-06-29 PROBLEM — E87.3 METABOLIC ALKALOSIS: Status: ACTIVE | Noted: 2022-06-29

## 2022-06-29 LAB
ALBUMIN SERPL BCP-MCNC: 3.2 G/DL (ref 3.5–5)
ALP SERPL-CCNC: 63 U/L (ref 46–116)
ALT SERPL W P-5'-P-CCNC: 34 U/L (ref 12–78)
ANION GAP SERPL CALCULATED.3IONS-SCNC: 10 MMOL/L (ref 4–13)
AST SERPL W P-5'-P-CCNC: 26 U/L (ref 5–45)
BILIRUB SERPL-MCNC: 0.32 MG/DL (ref 0.2–1)
BUN SERPL-MCNC: 9 MG/DL (ref 5–25)
CA-I BLD-SCNC: 1.07 MMOL/L (ref 1.12–1.32)
CALCIUM ALBUM COR SERPL-MCNC: 8.1 MG/DL (ref 8.3–10.1)
CALCIUM SERPL-MCNC: 7.5 MG/DL (ref 8.3–10.1)
CHLORIDE SERPL-SCNC: 106 MMOL/L (ref 100–108)
CO2 SERPL-SCNC: 22 MMOL/L (ref 21–32)
CREAT SERPL-MCNC: 0.69 MG/DL (ref 0.6–1.3)
ERYTHROCYTE [DISTWIDTH] IN BLOOD BY AUTOMATED COUNT: 11.9 % (ref 11.6–15.1)
FLUAV RNA RESP QL NAA+PROBE: NEGATIVE
FLUBV RNA RESP QL NAA+PROBE: NEGATIVE
GFR SERPL CREATININE-BSD FRML MDRD: 114 ML/MIN/1.73SQ M
GLUCOSE SERPL-MCNC: 106 MG/DL (ref 65–140)
HCT VFR BLD AUTO: 36.6 % (ref 34.8–46.1)
HGB BLD-MCNC: 12.1 G/DL (ref 11.5–15.4)
LACTATE SERPL-SCNC: 0.8 MMOL/L (ref 0.5–2)
MAGNESIUM SERPL-MCNC: 2.1 MG/DL (ref 1.6–2.6)
MCH RBC QN AUTO: 29.1 PG (ref 26.8–34.3)
MCHC RBC AUTO-ENTMCNC: 33.1 G/DL (ref 31.4–37.4)
MCV RBC AUTO: 88 FL (ref 82–98)
PH BLDV: 7.61 [PH] (ref 7.3–7.4)
PHOSPHATE SERPL-MCNC: 4.1 MG/DL (ref 2.7–4.5)
PLATELET # BLD AUTO: 201 THOUSANDS/UL (ref 149–390)
PMV BLD AUTO: 10.1 FL (ref 8.9–12.7)
POTASSIUM SERPL-SCNC: 4.2 MMOL/L (ref 3.5–5.3)
PROT SERPL-MCNC: 6 G/DL (ref 6.4–8.2)
RBC # BLD AUTO: 4.16 MILLION/UL (ref 3.81–5.12)
RSV RNA RESP QL NAA+PROBE: NEGATIVE
SARS-COV-2 RNA RESP QL NAA+PROBE: NEGATIVE
SODIUM SERPL-SCNC: 138 MMOL/L (ref 136–145)
WBC # BLD AUTO: 7.1 THOUSAND/UL (ref 4.31–10.16)

## 2022-06-29 PROCEDURE — 85025 COMPLETE CBC W/AUTO DIFF WBC: CPT | Performed by: NURSE PRACTITIONER

## 2022-06-29 PROCEDURE — 96374 THER/PROPH/DIAG INJ IV PUSH: CPT

## 2022-06-29 PROCEDURE — 71045 X-RAY EXAM CHEST 1 VIEW: CPT

## 2022-06-29 PROCEDURE — 82330 ASSAY OF CALCIUM: CPT | Performed by: NURSE PRACTITIONER

## 2022-06-29 PROCEDURE — 96365 THER/PROPH/DIAG IV INF INIT: CPT

## 2022-06-29 PROCEDURE — 96372 THER/PROPH/DIAG INJ SC/IM: CPT

## 2022-06-29 PROCEDURE — 99291 CRITICAL CARE FIRST HOUR: CPT | Performed by: NURSE PRACTITIONER

## 2022-06-29 PROCEDURE — 80053 COMPREHEN METABOLIC PANEL: CPT | Performed by: NURSE PRACTITIONER

## 2022-06-29 PROCEDURE — 84100 ASSAY OF PHOSPHORUS: CPT | Performed by: NURSE PRACTITIONER

## 2022-06-29 PROCEDURE — NC001 PR NO CHARGE: Performed by: PHYSICIAN ASSISTANT

## 2022-06-29 PROCEDURE — 0241U HB NFCT DS VIR RESP RNA 4 TRGT: CPT | Performed by: EMERGENCY MEDICINE

## 2022-06-29 PROCEDURE — 87081 CULTURE SCREEN ONLY: CPT | Performed by: NURSE PRACTITIONER

## 2022-06-29 PROCEDURE — 83605 ASSAY OF LACTIC ACID: CPT | Performed by: NURSE PRACTITIONER

## 2022-06-29 PROCEDURE — 83735 ASSAY OF MAGNESIUM: CPT | Performed by: NURSE PRACTITIONER

## 2022-06-29 RX ORDER — ENOXAPARIN SODIUM 100 MG/ML
40 INJECTION SUBCUTANEOUS DAILY
Status: DISCONTINUED | OUTPATIENT
Start: 2022-06-29 | End: 2022-06-29 | Stop reason: HOSPADM

## 2022-06-29 RX ORDER — LORAZEPAM 2 MG/ML
1 INJECTION INTRAMUSCULAR ONCE
Status: COMPLETED | OUTPATIENT
Start: 2022-06-29 | End: 2022-06-29

## 2022-06-29 RX ORDER — ALBUTEROL SULFATE 90 UG/1
2 AEROSOL, METERED RESPIRATORY (INHALATION) EVERY 4 HOURS PRN
Qty: 18 G | Refills: 6 | Status: SHIPPED | OUTPATIENT
Start: 2022-06-29

## 2022-06-29 RX ORDER — LORAZEPAM 2 MG/ML
INJECTION INTRAMUSCULAR
Status: COMPLETED
Start: 2022-06-29 | End: 2022-06-29

## 2022-06-29 RX ORDER — SODIUM CHLORIDE 9 MG/ML
125 INJECTION, SOLUTION INTRAVENOUS CONTINUOUS
Status: DISCONTINUED | OUTPATIENT
Start: 2022-06-29 | End: 2022-06-29

## 2022-06-29 RX ORDER — NALOXONE HYDROCHLORIDE 4 MG/.1ML
SPRAY NASAL
Qty: 1 EACH | Refills: 0 | Status: SHIPPED | OUTPATIENT
Start: 2022-06-29

## 2022-06-29 RX ORDER — MONTELUKAST SODIUM 10 MG/1
10 TABLET ORAL
Status: DISCONTINUED | OUTPATIENT
Start: 2022-06-29 | End: 2022-06-29 | Stop reason: HOSPADM

## 2022-06-29 RX ORDER — NALOXONE HYDROCHLORIDE 4 MG/.1ML
SPRAY NASAL
Qty: 1 EACH | Refills: 0 | Status: CANCELLED | OUTPATIENT
Start: 2022-06-29

## 2022-06-29 RX ORDER — LORAZEPAM 2 MG/ML
2 INJECTION INTRAMUSCULAR ONCE
Status: COMPLETED | OUTPATIENT
Start: 2022-06-29 | End: 2022-06-29

## 2022-06-29 RX ORDER — ALBUTEROL SULFATE 2.5 MG/3ML
2.5 SOLUTION RESPIRATORY (INHALATION) EVERY 6 HOURS PRN
Status: DISCONTINUED | OUTPATIENT
Start: 2022-06-29 | End: 2022-06-29 | Stop reason: HOSPADM

## 2022-06-29 RX ORDER — ALBUTEROL SULFATE 90 UG/1
2 AEROSOL, METERED RESPIRATORY (INHALATION) EVERY 4 HOURS PRN
Qty: 18 G | Refills: 0 | Status: SHIPPED | OUTPATIENT
Start: 2022-06-29 | End: 2022-06-29 | Stop reason: SDUPTHER

## 2022-06-29 RX ORDER — POTASSIUM CHLORIDE 20MEQ/15ML
40 LIQUID (ML) ORAL ONCE
Status: COMPLETED | OUTPATIENT
Start: 2022-06-29 | End: 2022-06-29

## 2022-06-29 RX ADMIN — SODIUM CHLORIDE 1000 ML: 0.9 INJECTION, SOLUTION INTRAVENOUS at 00:50

## 2022-06-29 RX ADMIN — LORAZEPAM 2 MG: 2 INJECTION INTRAMUSCULAR; INTRAVENOUS at 01:03

## 2022-06-29 RX ADMIN — POTASSIUM & SODIUM PHOSPHATES POWDER PACK 280-160-250 MG 1 PACKET: 280-160-250 PACK at 00:33

## 2022-06-29 RX ADMIN — SODIUM CHLORIDE 125 ML/HR: 0.9 INJECTION, SOLUTION INTRAVENOUS at 02:31

## 2022-06-29 RX ADMIN — LORAZEPAM 1 MG: 2 INJECTION INTRAMUSCULAR; INTRAVENOUS at 00:45

## 2022-06-29 RX ADMIN — ENOXAPARIN SODIUM 40 MG: 40 INJECTION SUBCUTANEOUS at 09:11

## 2022-06-29 RX ADMIN — SODIUM PHOSPHATE, MONOBASIC, MONOHYDRATE 12 MMOL: 276; 142 INJECTION, SOLUTION INTRAVENOUS at 02:41

## 2022-06-29 RX ADMIN — POTASSIUM CHLORIDE 40 MEQ: 20 SOLUTION ORAL at 03:58

## 2022-06-29 RX ADMIN — MAGNESIUM SULFATE HEPTAHYDRATE 2 G: 40 INJECTION, SOLUTION INTRAVENOUS at 00:12

## 2022-06-29 RX ADMIN — FLUTICASONE FUROATE AND VILANTEROL TRIFENATATE 1 PUFF: 100; 25 POWDER RESPIRATORY (INHALATION) at 13:11

## 2022-06-29 RX ADMIN — LORAZEPAM 1 MG: 2 INJECTION INTRAMUSCULAR; INTRAVENOUS at 00:36

## 2022-06-29 RX ADMIN — LORAZEPAM 2 MG: 2 INJECTION INTRAMUSCULAR at 01:03

## 2022-06-29 RX ADMIN — SODIUM CHLORIDE 125 ML/HR: 0.9 INJECTION, SOLUTION INTRAVENOUS at 09:50

## 2022-06-29 NOTE — ASSESSMENT & PLAN NOTE
· History with pulmonary as an outpatient - last seen 2 years ago  · Prescribed Singulair HS - hold for now  · Prescribed per Ventolin inhaler and albuterol nebs p r n  - schedule albuterol nebs p r n   For increasing shortness of breath/wheezing  · Will need follow-up as an outpatient

## 2022-06-29 NOTE — EMTALA/ACUTE CARE TRANSFER
[facility] EMERGENCY DEPARTMENT  [address] [facility] [address]  Dept: [phone]      EMTALA TRANSFER CONSENT    NAME Adonay WHITTAKER 1990                              MRN [id number]    I have been informed of my rights regarding examination, treatment, and transfer   by Dr Yonis Campbell MD    Benefits: Specialized equipment and/or services available at the receiving facility (Include comment)________________________    Risks: Potential for delay in receiving treatment, Potential deterioration of medical condition      Consent for Transfer:  I acknowledge that my medical condition has been evaluated and explained to me by the emergency department physician or other qualified medical person and/or my attending physician, who has recommended that I be transferred to the service of  Accepting Physician: Dr Ranjan Burks at [address] Name, [facility] : [id number]  The above potential benefits of such transfer, the potential risks associated with such transfer, and the probable risks of not being transferred have been explained to me, and I fully understand them  The doctor has explained that, in my case, the benefits of transfer outweigh the risks  I agree to be transferred  I authorize the performance of emergency medical procedures and treatments upon me in both transit and upon arrival at the receiving facility  Additionally, I authorize the release of any and all medical records to the receiving facility and request they be transported with me, if possible  I understand that the safest mode of transportation during a medical emergency is an ambulance and that the Hospital advocates the use of this mode of transport   Risks of traveling to the receiving facility by car, including absence of medical control, life sustaining equipment, such as oxygen, and medical personnel has been explained to me and I fully understand them  (GURMEET CORRECT BOX BELOW)  [  ]  I consent to the stated transfer and to be transported by ambulance/helicopter  [  ]  I consent to the stated transfer, but refuse transportation by ambulance and accept full responsibility for my transportation by car  I understand the risks of non-ambulance transfers and I exonerate the Hospital and its staff from any deterioration in my condition that results from this refusal     X___________________________________________    DATE  22  TIME________  Signature of patient or legally responsible individual signing on patient behalf           RELATIONSHIP TO PATIENT_________________________          Provider Certification    NAME Mattie Ohara                                         1990                              MRN [id number]    A medical screening exam was performed on the above named patient  Based on the examination:    Condition Necessitating Transfer The primary encounter diagnosis was Accidental drug overdose, initial encounter  Diagnoses of Hypophosphatemia, Hypokalemia, Hypomagnesemia, Agitation, and Altered mental status, unspecified altered mental status type were also pertinent to this visit      Patient Condition: The patient has been stabilized such that within reasonable medical probability, no material deterioration of the patient condition or the condition of the unborn child(aliya) is likely to result from the transfer, An emergency transfer is being made prior to stabilization due to the need for definitive care and the benefit of transfer outweighs the risk    Reason for Transfer: Level of Care needed not available at this facility    Transfer Requirements: [address]   · Space available and qualified personnel available for treatment as acknowledged by    · Agreed to accept transfer and to provide appropriate medical treatment as acknowledged by       Dr Yarelis Dwyer  · Appropriate medical records of the examination and treatment of the patient are provided at the time of transfer   [address] [facility], [address] _______  · Transfer will be performed by qualified personnel from    and appropriate transfer equipment as required, including the use of necessary and appropriate life support measures  Provider Certification: I have examined the patient and explained the following risks and benefits of being transferred/refusing transfer to the patient/family:         Based on these reasonable risks and benefits to the patient and/or the unborn child(aliya), and based upon the information available at the time of the patients examination, I certify that the medical benefits reasonably to be expected from the provision of appropriate medical treatments at another medical facility outweigh the increasing risks, if any, to the individuals medical condition, and in the case of labor to the unborn child, from effecting the transfer      X____________________________________________ DATE 06/29/22        TIME_______      ORIGINAL - SEND TO MEDICAL RECORDS   COPY - SEND WITH PATIENT DURING TRANSFER

## 2022-06-29 NOTE — NURSING NOTE
Discharge instructions including follow up visits and medications reviewed with patient and her significant other  Both state understanding with no further questions  Reviewed RX  to be picked up at [facility]    Patient discharged home via wheelchair with significant other Rachel Lanes

## 2022-06-29 NOTE — ASSESSMENT & PLAN NOTE
· As evidence by pH of 7 6 in the setting of accidental overdose  · Patient received 1 L normal saline in the ER at [facility]  · Continue IV fluids  · Replete electrolytes as needed

## 2022-06-29 NOTE — H&P
G[address] 48 1990, 34 y o  female MRN: [id number]  Unit/Bed#: ICU 02 Encounter: [id number]  Primary Care Provider: No primary care provider on file  Date and time admitted to hospital: 2022  2:03 AM    Accidental overdose  Assessment & Plan  · Presented to [place] [address] ER after snorting 6 bags of heroin - transferred to [facility] for higher level of care  · Supposedly thought that she was snorting cocaine  · Boyfriend administered Narcan at the scene but it was   · EMS administered an additional Narcan 4 mg  · Tylenol, salicylate, and alcohol negative  · Urine drug screen positive for benzos and methadone  · Check QTC on EKG  · Monitor electrolytes - replete as needed  · Trend neuro exams    Toxic encephalopathy  Assessment & Plan  · In the setting of accidental overdose with heroin  · Patient received  Narcan initially by her boyfriend and then another 4 mg by EMS  · Trend neuro exam  · Consider Precedex infusion/p r n  Ativan if needed    Metabolic alkalosis  Assessment & Plan  · As evidence by pH of 7 6 in the setting of accidental overdose  · Patient received 1 L normal saline in the ER at [place] [address]  · Continue IV fluids  · Replete electrolytes as needed    Hypokalemia  Assessment & Plan  · Potassium 2 6 on admission  · Received potassium phosphate 12 millimoles at [place] [address] ER  · Continue to trend and replete as needed  · Monitor for telemetry changes    Moderate persistent asthma without complication  Assessment & Plan  · History with pulmonary as an outpatient - last seen 2 years ago  · Prescribed Singulair HS - hold for now  · Prescribed per Ventolin inhaler and albuterol nebs p r n  - schedule albuterol nebs p r n   For increasing shortness of breath/wheezing  · Will need follow-up as an outpatient    Anxiety  Assessment & Plan  · History of Atarax as needed - hold for now in the setting of accidental drug overdose  · No documentation of anxiety workup  · Recommend following up for anxiety diagnosed as outpatient    -------------------------------------------------------------------------------------------------------------  Chief Complaint:  Accidental overdose    History of Present Illness   Josiane Saleem is a 34 y o  female who presents with a past med history of asthma and opiate abuse on methadone  Initially, the patient presented to the emergency room in [place] after snorting approximately 6 bags of heroin  The patient initially thought that she was snorting cocaine however found out later that she really snorted heroin, perhaps even fentanyl  At her apartment, her significant other gave her Narcan that was   She continued to have altered mental status so he activated EMS  On the scene, EMS gave 4 mg of Narcan but the patient remained altered  In the emergency room, she was confused and not cooperative  She ended up in 4 point restraints  She received a total of 4 mg of Ativan, and 1 L of normal saline  Labs of note included magnesium 1 5, K 2 6, and phosphorus less than 1 0 all of which were repleted in the emergency room [place]  On a VBG, the patient's pH was 7 6, and she had anion gap of 14 with a serum bicarb of 17  Due to her altered mental status and lack of cooperation, it was requested that the patient be transferred to the step-down unit for further monitoring/workup  On presentation to the ICU sil[facility], the patient is alert orient x4  She was cooperative and was able to transfer herself from the stretcher to the bed  She answered all questions appropriately and follow commands  Her 4 point restraints were removed  The patient is hemodynamically stable on room air  She states she has not used any illicit drugs in approximately 7 years since college however attempted to snort which she thought was cocaine earlier this evening  After she did it, she remembers people telling her that it was heroin or maybe fentanyl  At that point she does not remember much  She stated she had blacked out and felt weird and woke up in the emergency room  At this time, she states she is feeling better  She denies any nausea, vomiting, shortness of breath, chest pain, or palpitations  She is afebrile and hemodynamically stable at this time  Plan of care discussed with patient  Will monitor her telemetry replete electrolytes as needed  Patient has her mother Mariel Johnson listed as her emergency contact and at this time she is refusing for Mariel Johnson to be called  She stated that her significant other knows that she is here since he called EMS  History obtained from the patient   -------------------------------------------------------------------------------------------------------------  Dispo: Admit to Stepdown Level 1    Code Status: Full code   --------------------------------------------------------------------------------------------------------------  Review of Systems   Constitutional: Positive for fatigue  HENT: Negative  Eyes: Negative  Respiratory: Negative  Cardiovascular: Negative  Gastrointestinal: Negative  Endocrine: Negative  Genitourinary: Negative  Musculoskeletal: Negative  Skin: Negative  Allergic/Immunologic: Negative  Neurological: Positive for weakness  Hematological: Negative  Psychiatric/Behavioral: Negative  A 12-point, complete review of systems was reviewed and negative except as stated above     Physical Exam  Vitals and nursing note reviewed  Constitutional:       Appearance: Normal appearance  HENT:      Head: Normocephalic and atraumatic  Right Ear: Tympanic membrane, ear canal and external ear normal       Left Ear: Tympanic membrane, ear canal and external ear normal       Nose: Nose normal       Mouth/Throat:      Mouth: Mucous membranes are dry  Pharynx: Oropharynx is clear  Eyes:      Extraocular Movements: Extraocular movements intact  Conjunctiva/sclera: Conjunctivae normal       Pupils: Pupils are equal, round, and reactive to light  Cardiovascular:      Rate and Rhythm: Normal rate and regular rhythm  Pulmonary:      Effort: Pulmonary effort is normal       Breath sounds: Normal breath sounds  Comments: On room air  Abdominal:      General: Bowel sounds are normal       Palpations: Abdomen is soft  Genitourinary:     Comments: Due to void  Musculoskeletal:         General: Normal range of motion  Cervical back: Normal range of motion and neck supple  Skin:     General: Skin is warm and dry  Capillary Refill: Capillary refill takes less than 2 seconds  Neurological:      Mental Status: She is alert and oriented to person, place, and time  Mental status is at baseline  Psychiatric:      Comments: Flat affect but cooperative       --------------------------------------------------------------------------------------------------------------  Vitals: There were no vitals filed for this visit  Temp  Min: 97 4 °F (36 3 °C)  Max: 97 7 °F (36 5 °C)        There is no height or weight on file to calculate BMI      Laboratory and Diagnostics:  Results from last 7 days   Lab Units 06/28/22  2305   WBC Thousand/uL 9 84   HEMOGLOBIN g/dL 15 0   HEMATOCRIT % 43 0   PLATELETS Thousands/uL 307   NEUTROS PCT % 50   MONOS PCT % 11     Results from last 7 days   Lab Units 06/28/22  2305   SODIUM mmol/L 133*   POTASSIUM mmol/L 2 6*   CHLORIDE mmol/L 102   CO2 mmol/L 17*   ANION GAP mmol/L 14*   BUN mg/dL 9   CREATININE mg/dL 0 84   CALCIUM mg/dL 9 0   GLUCOSE RANDOM mg/dL 119   ALT U/L 23   AST U/L 22   ALK PHOS U/L 59   ALBUMIN g/dL 4 0   TOTAL BILIRUBIN mg/dL 0 39     Results from last 7 days   Lab Units 06/28/22  2305   MAGNESIUM mg/dL 1 5*   PHOSPHORUS mg/dL <1 0*               Results from last 7 days   Lab Units 06/28/22  2305   LACTIC ACID mmol/L 2 6*     ABG:    VBG:  Results from last 7 days   Lab Units 06/28/22  6975   PH MEE 7 608*           EKG: NSR HR 80  Imaging: I have personally reviewed pertinent reports  No results found  Historical Information   Past Medical History:   Diagnosis Date    Asthma     Concussion      No past surgical history on file  Social History   Social History     Substance and Sexual Activity   Alcohol Use No     Social History     Substance and Sexual Activity   Drug Use Yes    Types: Cocaine, Heroin     Social History     Tobacco Use   Smoking Status Former Smoker    Packs/day: 0 50    Years: 5 00    Pack years: 2 50    Types: Cigarettes    Quit date: 2018    Years since quittin 4   Smokeless Tobacco Never Used       Family History:   Family History   Problem Relation Age of Onset    Heart disease Father     Asthma Brother     Cancer Maternal Grandmother     Diabetes Maternal Grandmother      I have reviewed this patient's family history and commented on sigificant items within the HPI      Medications:  Current Facility-Administered Medications   Medication Dose Route Frequency    albuterol inhalation solution 2 5 mg  2 5 mg Nebulization Q6H PRN    enoxaparin (LOVENOX) subcutaneous injection 40 mg  40 mg Subcutaneous Daily    montelukast (SINGULAIR) tablet 10 mg  10 mg Oral HS    sodium chloride 0 9 % infusion  125 mL/hr Intravenous Continuous     Home medications:  Prior to Admission Medications   Prescriptions Last Dose Informant Patient Reported? Taking?    albuterol (2 5 mg/3 mL) 0 083 % nebulizer solution   No No   Sig: Take 1 vial (2 5 mg total) by nebulization every 6 (six) hours as needed for wheezing or shortness of breath   albuterol (ACCUNEB) 1 25 MG/3ML nebulizer solution   No No   Sig: Take 3 mL (1 25 mg total) by nebulization every 6 (six) hours as needed for wheezing   albuterol (PROVENTIL HFA,VENTOLIN HFA) 90 mcg/act inhaler   No No   Sig: INHALE 2 PUFFS EVERY 4 (FOUR) HOURS AS NEEDED FOR WHEEZING OR SHORTNESS OF BREATH   hydrOXYzine HCL (ATARAX) 25 mg tablet Yes No   Sig: TAKE 1 TABLET (25 MG TOTAL) BY MOUTH EVERY 6 (SIX) HOURS AS NEEDED FOR ANXIETY  montelukast (SINGULAIR) 10 mg tablet   No No   Sig: TAKE 1 TABLET (10 MG TOTAL) BY MOUTH DAILY AT BEDTIME      Facility-Administered Medications: None     Allergies:  No Known Allergies    ------------------------------------------------------------------------------------------------------------  Anticipated Length of Stay is > 2 midnights    Care Time Delivered:   Upon my evaluation, this patient had a high probability of imminent or life-threatening deterioration due to 61, which required my direct attention, intervention, and personal management  I have personally provided 45 minutes (0130 to 0215) of critical care time, exclusive of procedures, teaching, family meetings, and any prior time recorded by providers other than myself  [facility]        Portions of the record may have been created with voice recognition software  Occasional wrong word or "sound a like" substitutions may have occurred due to the inherent limitations of voice recognition software    Read the chart carefully and recognize, using context, where substitutions have occurred

## 2022-06-29 NOTE — ED PROVIDER NOTES
History  Chief Complaint   Patient presents with    Overdose - Accidental     Brought in by EMS, EMS reports pt thought she was snorting cocaine and turned out to be heroin, witness at scene stated about 6 bags, received 4mg Narcan intranasally in the field     Patient was a dose intranasal Narcan from her boyfriend which he said was 6 months   EMS arrived on scene in provided patient with another 4 mg intranasal Narcan  She is still quite confused and not a very reliable historian at this time  History provided by:  Patient   used: No    Overdose - Accidental  Ingested substance:  Illicit drugs  Illicit drug type: Other (Reported by the boyfriend to be either heroin or fentanyl)  Time since incident: Just prior to arrival   Ingestion amount:  Six packs  Witnesses present: yes    Witnessed by:  Partner  Called poison control: no    Incident location:  Home  Context: intentional ingestion        Prior to Admission Medications   Prescriptions Last Dose Informant Patient Reported? Taking? albuterol (2 5 mg/3 mL) 0 083 % nebulizer solution   No No   Sig: Take 1 vial (2 5 mg total) by nebulization every 6 (six) hours as needed for wheezing or shortness of breath   albuterol (ACCUNEB) 1 25 MG/3ML nebulizer solution   No No   Sig: Take 3 mL (1 25 mg total) by nebulization every 6 (six) hours as needed for wheezing   albuterol (PROVENTIL HFA,VENTOLIN HFA) 90 mcg/act inhaler   No No   Sig: INHALE 2 PUFFS EVERY 4 (FOUR) HOURS AS NEEDED FOR WHEEZING OR SHORTNESS OF BREATH   hydrOXYzine HCL (ATARAX) 25 mg tablet   Yes No   Sig: TAKE 1 TABLET (25 MG TOTAL) BY MOUTH EVERY 6 (SIX) HOURS AS NEEDED FOR ANXIETY  montelukast (SINGULAIR) 10 mg tablet   No No   Sig: TAKE 1 TABLET (10 MG TOTAL) BY MOUTH DAILY AT BEDTIME      Facility-Administered Medications: None       Past Medical History:   Diagnosis Date    Asthma     Concussion        History reviewed   No pertinent surgical history  Family History   Problem Relation Age of Onset    Heart disease Father     Asthma Brother     Cancer Maternal Grandmother     Diabetes Maternal Grandmother      I have reviewed and agree with the history as documented  E-Cigarette/Vaping    E-Cigarette Use Never User      E-Cigarette/Vaping Substances     Social History     Tobacco Use    Smoking status: Former Smoker     Packs/day: 0 50     Years: 5 00     Pack years: 2 50     Types: Cigarettes     Quit date: 2018     Years since quittin 4    Smokeless tobacco: Never Used   Vaping Use    Vaping Use: Never used   Substance Use Topics    Alcohol use: Never     Comment: 0 drinks    Drug use: Yes     Types: Cocaine, Heroin       Review of Systems   Unable to perform ROS: Mental status change       Physical Exam  Physical Exam  Vitals and nursing note reviewed  Constitutional:       General: She is not in acute distress  Appearance: She is well-developed  She is not diaphoretic  HENT:      Head: Normocephalic and atraumatic  Mouth/Throat:      Mouth: Mucous membranes are dry  Eyes:      Conjunctiva/sclera: Conjunctivae normal    Cardiovascular:      Rate and Rhythm: Regular rhythm  Tachycardia present  Pulses: Normal pulses  Heart sounds: No murmur heard  Pulmonary:      Effort: Tachypnea present  No respiratory distress  Breath sounds: Normal breath sounds  No stridor, decreased air movement or transmitted upper airway sounds  No wheezing, rhonchi or rales  Abdominal:      Palpations: Abdomen is soft  Tenderness: There is no abdominal tenderness  Musculoskeletal:         General: No swelling, tenderness or deformity  Cervical back: Neck supple  Right lower leg: No edema  Left lower leg: No edema  Skin:     General: Skin is warm and dry  Capillary Refill: Capillary refill takes less than 2 seconds  Findings: No erythema  Neurological:      Mental Status: She is alert  She is disoriented  Cranial Nerves: Cranial nerves are intact  Sensory: No sensory deficit  Motor: No tremor or seizure activity        Deep Tendon Reflexes: Reflexes normal          Vital Signs  ED Triage Vitals   Temperature Pulse Respirations Blood Pressure SpO2   06/28/22 2239 06/28/22 2236 06/28/22 2236 06/28/22 2238 06/28/22 2236   (!) 97 4 °F (36 3 °C) 101 (!) 30 115/53 100 %      Temp Source Heart Rate Source Patient Position - Orthostatic VS BP Location FiO2 (%)   06/28/22 2239 06/28/22 2344 06/28/22 2344 06/28/22 2344 --   Tympanic Monitor Lying Right arm       Pain Score       06/28/22 2344       No Pain           Vitals:    06/28/22 2238 06/28/22 2344 06/29/22 0003 06/29/22 0128   BP: 115/53 108/51 (!) 108/47 108/53   Pulse:  95 90 77   Patient Position - Orthostatic VS:  Lying Lying Lying         Visual Acuity      ED Medications  Medications   naloxone (FOR EMS ONLY) (NARCAN) 2 MG/2ML injection 2 mg (0 mg Does not apply Given to EMS 6/28/22 2245)   sodium chloride 0 9 % bolus 1,000 mL (0 mL Intravenous Stopped 6/28/22 2345)   magnesium sulfate 2 g/50 mL IVPB (premix) 2 g (0 g Intravenous Stopped 6/29/22 0140)   potassium-sodium phosphates (PHOS-NAK) packet 1 packet (1 packet Oral Given 6/29/22 0033)   LORazepam (ATIVAN) injection 1 mg (1 mg Intravenous Given 6/29/22 0036)   LORazepam (ATIVAN) injection 1 mg (1 mg Intravenous Given 6/29/22 0045)   LORazepam (ATIVAN) injection 2 mg (2 mg Intramuscular Given 6/29/22 0103)   sodium chloride 0 9 % bolus 1,000 mL (1,000 mL Intravenous New Bag 6/29/22 0050)       Diagnostic Studies  Results Reviewed     Procedure Component Value Units Date/Time    COVID/FLU/RSV [[id number]]  (Normal) Collected: 06/29/22 0132    Lab Status: Final result Specimen: Nares from Nose Updated: 06/29/22 0221     SARS-CoV-2 Negative     INFLUENZA A PCR Negative     INFLUENZA B PCR Negative     RSV PCR Negative    Narrative:      FOR PEDIATRIC PATIENTS - copy/paste COVID Guidelines URL to browser: [email or web address]    SARS-CoV-2 assay is a Nucleic Acid Amplification assay intended for the  qualitative detection of nucleic acid from SARS-CoV-2 in nasopharyngeal  swabs  Results are for the presumptive identification of SARS-CoV-2 RNA  Positive results are indicative of infection with SARS-CoV-2, the virus  causing COVID-19, but do not rule out bacterial infection or co-infection  with other viruses  Laboratories within the [place] and its  territories are required to report all positive results to the appropriate  public health authorities  Negative results do not preclude SARS-CoV-2  infection and should not be used as the sole basis for treatment or other  patient management decisions  Negative results must be combined with  clinical observations, patient history, and epidemiological information  This test has not been FDA cleared or approved  This test has been authorized by FDA under an Emergency Use Authorization  (EUA)  This test is only authorized for the duration of time the  declaration that circumstances exist justifying the authorization of the  emergency use of an in vitro diagnostic tests for detection of SARS-CoV-2  virus and/or diagnosis of COVID-19 infection under section 564(b)(1) of  the Act, 21 U  S C  956SYH-2(C)(5), unless the authorization is terminated  or revoked sooner  The test has been validated but independent review by FDA  and CLIA is pending  Test performed using [employer] GeneXpert: This RT-PCR assay targets N2,  a region unique to SARS-CoV-2   A conserved region in the E-gene was chosen  for pan-Sarbecovirus detection which includes SARS-CoV-2     pH, venous [[id number]]  (Abnormal) Collected: 06/28/22 2357    Lab Status: Final result Specimen: Blood from Arm, Left Updated: 06/29/22 0006     pH, [place] 7 608    TSH [[id number]]  (Abnormal) Collected: 06/28/22 2305    Lab Status: Final result Specimen: Blood from Arm, Left Updated: 06/28/22 2353     TSH 3RD GENERATON 9 379 uIU/mL     Narrative:      Patients undergoing fluorescein dye angiography may retain small amounts of fluorescein in the body for 48-72 hours post procedure  Samples containing fluorescein can produce falsely depressed TSH values  If the patient had this procedure,a specimen should be resubmitted post fluorescein clearance        Comprehensive metabolic panel [[id number]]  (Abnormal) Collected: 06/28/22 2305    Lab Status: Final result Specimen: Blood from Arm, Left Updated: 06/28/22 2342     Sodium 133 mmol/L      Potassium 2 6 mmol/L      Chloride 102 mmol/L      CO2 17 mmol/L      ANION GAP 14 mmol/L      BUN 9 mg/dL      Creatinine 0 84 mg/dL      Glucose 119 mg/dL      Calcium 9 0 mg/dL      AST 22 U/L      ALT 23 U/L      Alkaline Phosphatase 59 U/L      Total Protein 6 6 g/dL      Albumin 4 0 g/dL      Total Bilirubin 0 39 mg/dL      eGFR 91 ml/min/1 73sq m     Narrative:      [place] guidelines for Chronic Kidney Disease (CKD):     Stage 1 with normal or high GFR (GFR > 90 mL/min/1 73 square meters)    Stage 2 Mild CKD (GFR = 60-89 mL/min/1 73 square meters)    Stage 3A Moderate CKD (GFR = 45-59 mL/min/1 73 square meters)    Stage 3B Moderate CKD (GFR = 30-44 mL/min/1 73 square meters)    Stage 4 Severe CKD (GFR = 15-29 mL/min/1 73 square meters)    Stage 5 End Stage CKD (GFR <15 mL/min/1 73 square meters)  Note: GFR calculation is accurate only with a steady state creatinine    Phosphorus [[id number]]  (Abnormal) Collected: 06/28/22 2305    Lab Status: Final result Specimen: Blood from Arm, Left Updated: 06/28/22 2342     Phosphorus <1 0 mg/dL     Lactic acid [[id number]]  (Abnormal) Collected: 06/28/22 2305    Lab Status: Final result Specimen: Blood from Arm, Left Updated: 06/28/22 2341     LACTIC ACID 2 6 mmol/L     Narrative:      Result may be elevated if tourniquet was used during collection  Acetaminophen level-"If concentration is detectable, please discuss with medical  on call " [[id number]]  (Abnormal) Collected: 06/28/22 2305    Lab Status: Final result Specimen: Blood from Arm, Left Updated: 06/28/22 2339     Acetaminophen Level <50 ug/mL     Salicylate level [[id number]]  (Normal) Collected: 06/28/22 2305    Lab Status: Final result Specimen: Blood from Arm, Left Updated: 77/34/44 3398     Salicylate Lvl <5 mg/dL     Ammonia [[id number]]  (Normal) Collected: 06/28/22 2305    Lab Status: Final result Specimen: Blood from Arm, Left Updated: 06/28/22 2339     Ammonia 43 umol/L     Ethanol [[id number]]  (Normal) Collected: 06/28/22 2305    Lab Status: Final result Specimen: Blood from Arm, Left Updated: 06/28/22 2339     Ethanol Lvl <10 mg/dL     Rapid drug screen, urine [[id number]]  (Abnormal) Collected: 06/28/22 2307    Lab Status: Final result Specimen: Urine, Catheter Updated: 06/28/22 2338     Amph/Meth UR Negative     Barbiturate Ur Negative     Benzodiazepine Urine Positive     Cocaine Urine Negative     Methadone Urine Positive     Opiate Urine Negative     PCP Ur Negative     THC Urine Negative     Oxycodone Urine Negative    Narrative:      Presumptive report  If requested, specimen will be sent to reference lab for confirmation  FOR MEDICAL PURPOSES ONLY  IF CONFIRMATION NEEDED PLEASE CONTACT THE LAB WITHIN 5 DAYS      Drug Screen Cutoff Levels:  AMPHETAMINE/METHAMPHETAMINES  1000 ng/mL  BARBITURATES     200 ng/mL  BENZODIAZEPINES     200 ng/mL  COCAINE      300 ng/mL  METHADONE      300 ng/mL  OPIATES      300 ng/mL  PHENCYCLIDINE     25 ng/mL  THC       50 ng/mL  OXYCODONE      100 ng/mL    Bilirubin, direct [[id number]]  (Normal) Collected: 06/28/22 2305    Lab Status: Final result Specimen: Blood from Arm, Left Updated: 06/28/22 2338     Bilirubin, Direct 0 09 mg/dL     Magnesium [[id number]]  (Abnormal) Collected: 06/28/22 2305    Lab Status: Final result Specimen: Blood from Arm, Left Updated: 06/28/22 2338     Magnesium 1 5 mg/dL     UA w Reflex to Microscopic w Reflex to Culture [[id number]]  (Abnormal) Collected: 06/28/22 2307    Lab Status: Final result Specimen: Urine, Straight Cath Updated: 06/28/22 2322     Color, UA Yellow     Clarity, UA Clear     Specific Gravity, UA 1 015     pH, UA 7 5     Leukocytes, UA Negative     Nitrite, UA Negative     Protein, UA Negative mg/dl      Glucose, UA Negative mg/dl      Ketones, UA Trace mg/dl      Urobilinogen, UA 0 2 E U /dl      Bilirubin, UA Negative     Occult Blood, UA Negative    POCT pregnancy, urine [[id number]]  (Normal) Resulted: 06/28/22 2317    Lab Status: Final result Updated: 06/28/22 2317     EXT PREG TEST UR (Ref: Negative) Negative     Control Valid    CBC and differential [[id number]]  (Abnormal) Collected: 06/28/22 2305    Lab Status: Final result Specimen: Blood from Arm, Left Updated: 06/28/22 2317     WBC 9 84 Thousand/uL      RBC 5 17 Million/uL      Hemoglobin 15 0 g/dL      Hematocrit 43 0 %      MCV 83 fL      MCH 29 0 pg      MCHC 34 9 g/dL      RDW 11 9 %      MPV 10 9 fL      Platelets 069 Thousands/uL      nRBC 0 /100 WBCs      Neutrophils Relative 50 %      Immat GRANS % 0 %      Lymphocytes Relative 38 %      Monocytes Relative 11 %      Eosinophils Relative 1 %      Basophils Relative 0 %      Neutrophils Absolute 4 90 Thousands/µL      Immature Grans Absolute 0 02 Thousand/uL      Lymphocytes Absolute 3 71 Thousands/µL      Monocytes Absolute 1 03 Thousand/µL      Eosinophils Absolute 0 14 Thousand/µL      Basophils Absolute 0 04 Thousands/µL     Fingerstick Glucose (POCT) [[id number]]  (Normal) Collected: 06/28/22 2251    Lab Status: Final result Updated: 06/28/22 2255     POC Glucose 126 mg/dl                  XR chest 1 view portable    (Results Pending)              Procedures  Procedures         ED Course  ED Course as of 06/29/22 0508   Tue Jun 28, 2022   2356 Patient found to be hypophosphatemic as well as hyponatremic and hypomagnesemic  Direct etiology for these electrolyte disturbances are not quite known yet  EKG shows ventricular rate of 101 beats per minute with a   Wed Jun 29, 2022   2581 Discussed case with critical care attending at [facility]  Patient will be accepted for transfer for continued monitoring and treatment  Patient's mental status has changed she is now extremely agitated and combative  Order 2 mg of IV Ativan and soft restraints  0799 The IV that the original Ativan order tell was given infiltrated  Patient still quite agitated so additional I am Ativan is ordered while we obtain additional IV access  SBIRT 22yo+    Flowsheet Row Most Recent Value   SBIRT (23 yo +)    In order to provide better care to our patients, we are screening all of our patients for alcohol and drug use  Would it be okay to ask you these screening questions? Unable to answer at this time Filed at: 06/28/2022 7867                    MDM  Number of Diagnoses or Management Options     Amount and/or Complexity of Data Reviewed  Clinical lab tests: reviewed and ordered  Review and summarize past medical records: yes    Risk of Complications, Morbidity, and/or Mortality  Presenting problems: high  Diagnostic procedures: high  Management options: high  General comments: Patient transferred to ICU at our for critical care evaluation and management          Disposition  Final diagnoses:   Accidental drug overdose, initial encounter   Hypophosphatemia   Hypokalemia   Hypomagnesemia   Agitation   Altered mental status, unspecified altered mental status type     Time reflects when diagnosis was documented in both MDM as applicable and the Disposition within this note     Time User Action Codes Description Comment    6/29/2022 12:05 AM Cindy Hwang [T50 901A] Accidental drug overdose, initial encounter     6/29/2022 12:05 AM Cindy Hwang [E83 39] Hypophosphatemia     6/29/2022 12:05 AM Cesilia Finical Add [E87 6] Hypokalemia     6/29/2022 12:06 AM Cesilia Finical Add [E83 42] Hypomagnesemia     6/29/2022 12:30 AM Cesilia Finical Add [R45 1] Agitation     6/29/2022 12:32 AM Cesilia Finical Add [R41 82] Altered mental status, unspecified altered mental status type       ED Disposition     ED Disposition   Transfer to Another Facility-In Network    Condition   --    Date/Time   Wed Jun 29, 2022 12:06 AM    Comment   Kristal Neely should be transferred out to warrant             MD Documentation    Michele Vee Most Recent Value   Patient Condition The patient has been stabilized such that within reasonable medical probability, no material deterioration of the patient condition or the condition of the unborn child(aliya) is likely to result from the transfer, An emergency transfer is being made prior to stabilization due to the need for definitive care and the benefit of transfer outweighs the risk   Reason for Transfer Level of Care needed not available at this facility   Benefits of Transfer Specialized equipment and/or services available at the receiving facility (Include comment)________________________   Risks of Transfer Potential for delay in receiving treatment, Potential deterioration of medical condition   Accepting Physician Dr J Carlos Duran Name, Agnes Meléndez   Sending MD vasquez      RN Documentation    [address] Susannah Foy Name, [address]      Follow-up Information    None         Discharge Medication List as of 6/29/2022  1:53 AM      CONTINUE these medications which have NOT CHANGED    Details   albuterol (2 5 mg/3 mL) 0 083 % nebulizer solution Take 1 vial (2 5 mg total) by nebulization every 6 (six) hours as needed for wheezing or shortness of breath, Starting Mon 1/25/2021, Normal      albuterol (ACCUNEB) 1 25 MG/3ML nebulizer solution Take 3 mL (1 25 mg total) by nebulization every 6 (six) hours as needed for wheezing, Starting Thu 7/23/2020, Normal      albuterol (PROVENTIL HFA,VENTOLIN HFA) 90 mcg/act inhaler INHALE 2 PUFFS EVERY 4 (FOUR) HOURS AS NEEDED FOR WHEEZING OR SHORTNESS OF BREATH, Starting Wed 6/3/2020, Normal      hydrOXYzine HCL (ATARAX) 25 mg tablet TAKE 1 TABLET (25 MG TOTAL) BY MOUTH EVERY 6 (SIX) HOURS AS NEEDED FOR ANXIETY , Historical Med      montelukast (SINGULAIR) 10 mg tablet TAKE 1 TABLET (10 MG TOTAL) BY MOUTH DAILY AT BEDTIME, Starting Tue 10/22/2019, Normal             No discharge procedures on file      PDMP Review       Value Time User    PDMP Reviewed  Yes 1/25/2021  2:24 AM Adriana Magaña MD          ED Provider  Electronically Signed by           Dipti Junior MD  06/29/22 0867

## 2022-06-29 NOTE — ASSESSMENT & PLAN NOTE
· Presented to [facility] ER after snorting 6 bags of heroin - transferred to [facility] ICU for higher level of care  · Supposedly thought that she was snorting cocaine  · Boyfriend administered Narcan at the scene but it was   · EMS administered an additional Narcan 4 mg  · Tylenol, salicylate, and alcohol negative  · Urine drug screen positive for benzos and methadone  · Check QTC on EKG  · Monitor electrolytes - replete as needed  · Trend neuro exams

## 2022-06-29 NOTE — ASSESSMENT & PLAN NOTE
· Potassium 2 6 on admission  · Received potassium phosphate 12 millimoles at [facility] ER  · Continue to trend and replete as needed  · Monitor for telemetry changes

## 2022-06-29 NOTE — ASSESSMENT & PLAN NOTE
· History of Atarax as needed - hold for now in the setting of accidental drug overdose  · No documentation of anxiety workup  · Recommend following up for anxiety diagnosed as outpatient

## 2022-06-29 NOTE — ASSESSMENT & PLAN NOTE
· In the setting of accidental overdose with heroin  · Patient received  Narcan initially by her boyfriend and then another 4 mg by EMS  · Trend neuro exam  · Consider Precedex infusion/p r n    Ativan if needed

## 2022-06-29 NOTE — DISCHARGE SUMMARY
Discharge Summary - Krista Monroy 34 y o  female MRN: [id number]    Unit/Bed#: ICU 02 Encounter: [id number]    Admission Date:   Admission Orders (From admission, onward)     Ordered        22  Inpatient Admission  Once                        Admitting Diagnosis: Accidental overdose [T50 901A]    HPI: "Krista Monroy is a 34 y o  female who presents with a past med history of asthma and opiate abuse on methadone  Initially, the patient presented to the emergency room in [facility] after snorting approximately 6 bags of heroin  The patient initially thought that she was snorting cocaine however found out later that she really snorted heroin, perhaps even fentanyl  At her apartment, her significant other gave her Narcan that was   She continued to have altered mental status so he activated EMS  On the scene, EMS gave 4 mg of Narcan but the patient remained altered  In the emergency room, she was confused and not cooperative  She ended up in 4 point restraints  She received a total of 4 mg of Ativan, and 1 L of normal saline  Labs of note included magnesium 1 5, K 2 6, and phosphorus less than 1 0 all of which were repleted in the emergency room OS  On a VBG, the patient's pH was 7 6, and she had anion gap of 14 with a serum bicarb of 17  Due to her altered mental status and lack of cooperation, it was requested that the patient be transferred to the step-down unit for further monitoring/workup       On presentation to the ICU a [place], the patient is alert orient x4  She was cooperative and was able to transfer herself from the stretcher to the bed  She answered all questions appropriately and follow commands  Her 4 point restraints were removed  The patient is hemodynamically stable on room air  She states she has not used any illicit drugs in approximately 7 years since college however attempted to snort which she thought was cocaine earlier this evening    After she did it, she remembers people telling her that it was heroin or maybe fentanyl  At that point she does not remember much  She stated she had blacked out and felt weird and woke up in the emergency room  At this time, she states she is feeling better  She denies any nausea, vomiting, shortness of breath, chest pain, or palpitations  She is afebrile and hemodynamically stable at this time  Plan of care discussed with patient  Will monitor her telemetry replete electrolytes as needed      Patient has her mother Gume Mondragon listed as her emergency contact and at this time she is refusing for Gume Mondragon to be called  She stated that her significant other knows that she is here since he called EMS "    Procedures Performed: No orders of the defined types were placed in this encounter  Summary of Hospital Course: Patient admitted and observed  Patient remained stable, and was back to baseline this morning  Expresses remorse over decisions and does not intend to use again  Will follow-up with methadone clinic  Significant Findings, Care, Treatment and Services Provided: as above    Complications: none    Discharge Diagnosis: accidental overdose    Medical Problems             Resolved Problems  Date Reviewed: 6/29/2022   None                 Condition at Discharge: good         Discharge instructions/Information to patient and family:   See after visit summary for information provided to patient and family  Provisions for Follow-Up Care:  See after visit summary for information related to follow-up care and any pertinent home health orders  PCP: No primary care provider on file  Disposition: Home    Planned Readmission: No      Discharge Statement   I spent 20 minutes discharging the patient  This time was spent on the day of discharge  I had direct contact with the patient on the day of discharge  Additional documentation is required if more than 30 minutes were spent on discharge       Discharge Medications:  See after visit summary for reconciled discharge medications provided to patient and family

## 2022-06-29 NOTE — ED NOTES
[facility] ICU Bed 2  Dr Bina Salas accepting  Call [phone] for report     Jonathan [place], RN  06/29/22 9369

## 2022-06-29 NOTE — UTILIZATION REVIEW
Initial Clinical Review    Admission: Date/Time/Statement:   Admission Orders (From admission, onward)     Ordered        22 0212  Inpatient Admission  Once                      Orders Placed This Encounter   Procedures    Inpatient Admission     Standing Status:   Standing     Number of Occurrences:   1     Order Specific Question:   Level of Care     Answer:   Level 1 Stepdown [13]     Order Specific Question:   Estimated length of stay     Answer:   More than 2 Midnights     Order Specific Question:   Certification     Answer:   I certify that inpatient services are medically necessary for this patient for a duration of greater than two midnights  See H&P and MD Progress Notes for additional information about the patient's course of treatment  Initial Presentation: 34 y o  female , presented to the ED @ [address], transferred to [facility], higher level of care, via EMS  Admitted as Inpatient due to Accidental Overdose  CYNTHIA Pathak:  Initially, the patient presented to the emergency room in [facility] after snorting approximately 6 bags of heroin  The patient initially thought that she was snorting cocaine however found out later that she really snorted heroin, perhaps even fentanyl  At her apartment, her significant other gave her Narcan that was   She continued to have altered mental status so he activated EMS  On the scene, EMS gave 4 mg of Narcan but the patient remained altered  In the emergency room, she was confused and not cooperative  She ended up in 4 point restraints  She received a total of 4 mg of Ativan, and 1 L of normal saline  Labs of note included magnesium 1 5, K 2 6, and phosphorus less than 1 0 all of which were repleted in the emergency room [facility]  On a VBG, the patient's pH was 7 6, and she had anion gap of 14 with a serum bicarb of 17   Due to her altered mental status and lack of cooperation, it was requested that the patient be transferred to the step-down unit for further monitoring/workup  Date: 26/08/3727   Tylenol, salicylate, and alcohol negative  Urine drug screen positive for benzos and methadone  Check QTC on EKG  Monitor on telemetry  Monitor electrolytes - replete as needed  Trend neuro exams  Continue IV flds          ED Triage Vitals [06/29/22 0218]   Temperature Pulse Respirations Blood Pressure SpO2   (!) 97 4 °F (36 3 °C) 80 22 102/59 99 %      Temp Source Heart Rate Source Patient Position - Orthostatic VS BP Location FiO2 (%)   Temporal Monitor Lying Left arm --      Pain Score       No Pain          Wt Readings from Last 1 Encounters:   06/29/22 75 1 kg (165 lb 9 1 oz)     Additional Vital Signs:   Date/Time Temp Pulse Resp BP MAP (mmHg) SpO2 O2 Device Patient Position - Orthostatic VS   06/29/22 0800 98 °F (36 7 °C) 81 15 95/53 71 100 % None (Room air) Lying   06/29/22 0600 -- 83 19 101/56 73 100 % -- --   06/29/22 0400 97 3 °F (36 3 °C) Abnormal  92 23 Abnormal  95/52 68 99 % -- --   06/29/22 0254 -- 81 18 -- -- 99 % -- --     Date and Time Eye Opening Best Verbal Response Best Motor Response Harwood Coma Scale Score   06/29/22 0800 4 5 6 15   06/29/22 0300 4 5 6 15   06/28/22 2240 4 4 6 14     Pertinent Labs/Diagnostic Test Results:   Results from last 7 days   Lab Units 06/29/22  0132   SARS-COV-2  Negative     Results from last 7 days   Lab Units 06/29/22  0548 06/28/22  2305   WBC Thousand/uL 7 10 9 84   HEMOGLOBIN g/dL 12 1 15 0   HEMATOCRIT % 36 6 43 0   PLATELETS Thousands/uL 201 307   NEUTROS ABS Thousands/µL  --  4 90     Results from last 7 days   Lab Units 06/29/22  0548 06/28/22  2305   SODIUM mmol/L 138 133*   POTASSIUM mmol/L 4 2 2 6*   CHLORIDE mmol/L 106 102   CO2 mmol/L 22 17*   ANION GAP mmol/L 10 14*   BUN mg/dL 9 9   CREATININE mg/dL 0 69 0 84   EGFR ml/min/1 73sq m 114 91   CALCIUM mg/dL 7 5* 9 0   CALCIUM, IONIZED mmol/L 1 07*  --    MAGNESIUM mg/dL 2 1 1 5*   PHOSPHORUS mg/dL 4 1 <1 0*     Results from last 7 days   Lab Units 06/29/22  0548 06/28/22  2305   AST U/L 26 22   ALT U/L 34 23   ALK PHOS U/L 63 59   TOTAL PROTEIN g/dL 6 0* 6 6   ALBUMIN g/dL 3 2* 4 0   TOTAL BILIRUBIN mg/dL 0 32 0 39   BILIRUBIN DIRECT mg/dL  --  0 09   AMMONIA umol/L  --  43     Results from last 7 days   Lab Units 06/28/22  2251   POC GLUCOSE mg/dl 126     Results from last 7 days   Lab Units 06/29/22  0548 06/28/22  2305   GLUCOSE RANDOM mg/dL 106 119     Results from last 7 days   Lab Units 06/28/22  2357   PH MEE  7 608*     Results from last 7 days   Lab Units 06/28/22  2305   TSH 3RD GENERATON uIU/mL 9 379*     Results from last 7 days   Lab Units 06/29/22  0548 06/28/22  2305   LACTIC ACID mmol/L 0 8 2 6*     Results from last 7 days   Lab Units 06/28/22  2307   CLARITY UA  Clear   COLOR UA  Yellow   SPEC GRAV UA  1 015   PH UA  7 5   GLUCOSE UA mg/dl Negative   KETONES UA mg/dl Trace*   BLOOD UA  Negative   PROTEIN UA mg/dl Negative   NITRITE UA  Negative   BILIRUBIN UA  Negative   UROBILINOGEN UA E U /dl 0 2   LEUKOCYTES UA  Negative     Results from last 7 days   Lab Units 06/29/22  0132   INFLUENZA A PCR  Negative   INFLUENZA B PCR  Negative   RSV PCR  Negative     Results from last 7 days   Lab Units 06/28/22  2307   AMPH/METH  Negative   BARBITURATE UR  Negative   BENZODIAZEPINE UR  Positive*   COCAINE UR  Negative   METHADONE URINE  Positive*   OPIATE UR  Negative   PCP UR  Negative   THC UR  Negative     Results from last 7 days   Lab Units 06/28/22  2305   ETHANOL LVL mg/dL <10   ACETAMINOPHEN LVL ug/mL <00*   SALICYLATE LVL mg/dL <5     Past Medical History:   Diagnosis Date    Asthma     Concussion      Present on Admission:   Accidental overdose   Toxic encephalopathy   Metabolic alkalosis   Hypokalemia   Moderate persistent asthma without complication   Anxiety      Admitting Diagnosis: Accidental overdose [T50 283S]  Age/Sex: 34 y o  female  Admission Orders:  Dysphagia Assessment > Regular diet  Fall precaution  Up with assistance  Turn patient q2h  Daily weight   I&O  Neuro checks q4h  Jarek SCDs      Scheduled Medications:  enoxaparin, 40 mg, Subcutaneous, Daily  montelukast, 10 mg, Oral, HS      Continuous IV Infusions:  sodium chloride, 125 mL/hr, Intravenous, Continuous      PRN Meds:  albuterol, 2 5 mg, Nebulization, Q6H PRN        IP CONSULT TO CASE MANAGEMENT    Network Utilization Review Department  ATTENTION: Please call with any questions or concerns to [phone] and carefully listen to the prompts so that you are directed to the right person  All voicemails are confidential   Ang Sheikh all requests for admission clinical reviews, approved or denied determinations and any other requests to dedicated fax number below belonging to the campus where the patient is receiving treatment   List of dedicated fax numbers for the Facilities:  [address] DENIALS (Administrative/Medical Necessity) [phone]   [address] (Maternity/NICU/Pediatrics) [phone]   [address]  [address] [address] Medical [place] [address] [address] [place] [address] Emelia Vicky [address] [facility] [place] [address] [phone]

## 2022-06-30 LAB
ATRIAL RATE: 101 BPM
MRSA NOSE QL CULT: NORMAL
P AXIS: 62 DEGREES
PR INTERVAL: 132 MS
QRS AXIS: 61 DEGREES
QRSD INTERVAL: 84 MS
QT INTERVAL: 436 MS
QTC INTERVAL: 565 MS
T WAVE AXIS: -9 DEGREES
VENTRICULAR RATE: 101 BPM

## 2022-06-30 PROCEDURE — 93010 ELECTROCARDIOGRAM REPORT: CPT | Performed by: INTERNAL MEDICINE

## 2022-06-30 NOTE — UTILIZATION REVIEW
Inpatient Admission Authorization Request   NOTIFICATION OF INPATIENT ADMISSION/INPATIENT AUTHORIZATION REQUEST   SERVICING FACILITY:   [facility]  [address] [place] [address], [place], [place]   Tax ID: [id number]  NPI: [id number]  Place of Service: Inpatient [address] [facility]  [address]  Place of Service Code: 24     ATTENDING PROVIDER:  Attending Name and NPI#: Doug Monzon Md [[id number]]  Address: [address] [place], [place] [address] [address]  Phone: [phone]     UTILIZATION REVIEW CONTACT:  Rocco Pretty Utilization   Network Utilization Review Department  Phone: [phone]  Fax [phone]  Email: [email or web address]     PHYSICIAN ADVISORY SERVICES:  FOR IZAH-EF-HFDU REVIEW - MEDICAL NECESSITY DENIAL  Phone: [phone]  Fax: [phone]  Email: [email or web address]     TYPE OF REQUEST:  Inpatient Status     ADMISSION INFORMATION:  ADMISSION DATE/TIME: 6/29/22  2:03 AM  PATIENT DIAGNOSIS CODE/DESCRIPTION:  Accidental overdose [T50 901A]  DISCHARGE DATE/TIME: 6/29/2022  2:55 PM   IMPORTANT INFORMATION:  Please contact Rocco Pretty directly with any questions or concerns regarding this request  Department voicemails are confidential     Send requests for admission clinical reviews, concurrent reviews, approvals, and administrative denials due to lack of clinical to fax [phone]

## 2022-07-01 NOTE — UTILIZATION REVIEW
Notification of Discharge   This is a Notification of Discharge from our facility [address]  Please be advised that this patient has been discharge from our facility  Below you will find the admission and discharge date and time including the patients disposition  UTILIZATION REVIEW CONTACT:  Samy Ji  Utilization   Network Utilization Review Department  Phone: [phone] x carefully listen to the prompts  All voicemails are confidential   Email: [email or web address]     PHYSICIAN ADVISORY SERVICES:  FOR BIWT-TV-ADSY REVIEW - MEDICAL NECESSITY DENIAL  Phone: [phone]  Fax: [phone]  Email: [email or web address]     PRESENTATION DATE: 6/29/2022  2:03 AM  OBERVATION ADMISSION DATE:   INPATIENT ADMISSION DATE: 6/29/22  2:03 AM   DISCHARGE DATE: 6/29/2022  2:55 PM  DISPOSITION: Home/Self Care Home/Self Care      IMPORTANT INFORMATION:  Send all requests for admission clinical reviews, approved or denied determinations and any other requests to dedicated fax number below belonging to the campus where the patient is receiving treatment   List of dedicated fax numbers:  [address] DENIALS (Administrative/Medical Necessity) [phone]   [address] (Maternity/NICU/Pediatrics) [phone]   Nicky Long [phone]   Enrico Guerrero [phone]   [facility] Mitch [phone]   [address] [place] [address],[address] [phone]   [facility]  [phone]   [address] [place] [address], [facility]  2401 [facility] 1000 [facility] [phone]

## 2023-01-03 NOTE — CASE MANAGEMENT
Initial Clinical Review    Admission: Date/Time/Statement: 7/8/18 2048    Orders Placed This Encounter   Procedures    Place in Observation (expected length of stay for this patient is less than two midnights)     Standing Status:   Standing     Number of Occurrences:   1     Order Specific Question:   Admitting Physician     Answer:   Michelle Singleton [[id number]]     Order Specific Question:   Level of Care     Answer:   Med Surg [16]         ED: Date/Time/Mode of Arrival:   ED Arrival Information     Expected Arrival Acuity Means of Arrival Escorted By Service Admission Type    - 7/8/2018 19:06 Emergent Walk-In Family Member General Medicine Emergency    Arrival Complaint    ASTHMA          Chief Complaint:   Chief Complaint   Patient presents with    Shortness of Breath     asthma attack       History of Illness: Veronica Martinez is a 33 y o  female with a PMH of Asthma who presents with shortness of breath  She states that she was in her usual state of health until approximately 3 days ago when she developed shortness of breath  This progressively worsened with time  She also reported a mild productive cough and chest pain with deep inspiration  She reports this is her typical Asthma exacerbation  She came to the ED today as her symptoms were not improving  While here she received IV Solumedrol and Duonebs which improved her symptoms  Currently she reports feeling 50% better though still has some shortness of breath with exertion  No other complaints or concerns       ED Vital Signs:   ED Triage Vitals   Temperature Pulse Respirations Blood Pressure SpO2   07/08/18 1908 07/08/18 1908 07/08/18 1908 07/08/18 1908 07/08/18 1908   98 3 °F (36 8 °C) (!) 112 (!) 28 140/74 (!) 87 %      Temp Source Heart Rate Source Patient Position - Orthostatic VS BP Location FiO2 (%)   07/08/18 1908 07/08/18 2044 07/08/18 1908 07/08/18 1908 --   Tympanic Monitor Sitting Right arm       Pain Score       07/08/18 1908       No Pain Wt Readings from Last 1 Encounters:   07/08/18 57 8 kg (127 lb 6 8 oz)       ED Treatment:   Medication Administration from 07/08/2018 1906 to 07/08/2018 2121       Date/Time Order Dose Route Action Action by Comments     07/08/2018 1909 ipratropium-albuterol (DUO-NEB) 0 5-2 5 mg/3 mL inhalation solution 3 mL 3 mL Nebulization Given Mary Anderson, RN      07/08/2018 1936 albuterol CONTINUOUS nebulizing solution 10 mg Nebulization Given Stephanie Mckeon, ELEANOR      07/08/2018 2040 sodium chloride 0 9 % bolus 500 mL 0 mL Intravenous Stopped Mandy Desuoza RN      07/08/2018 1936 sodium chloride 0 9 % bolus 500 mL 500 mL Intravenous [address] [facility]      07/08/2018 1936 methylPREDNISolone sodium succinate (Solu-MEDROL) injection 80 mg 80 mg Intravenous Given Mandy Desouza RN      07/08/2018 2102 magnesium sulfate 2 g/50 mL IVPB (premix) 2 g 2 g Intravenous New Bag Mandy Desouza RN           Past Medical/Surgical History: Active Ambulatory Problems     Diagnosis Date Noted    No Active Ambulatory Problems     Resolved Ambulatory Problems     Diagnosis Date Noted    No Resolved Ambulatory Problems     Past Medical History:   Diagnosis Date    Asthma        Admitting Diagnosis: Shortness of breath [R06 02]  Asthma exacerbation [J45 901]    Age/Sex: 33 y o  female    Assessment/Plan:   Asthma exacerbation   Assessment & Plan     Pt received Solumedrol, Duonebs, Magnesium and Albuterol in the ED with some improvement in her symptoms  Will admit for Observation, start on scheduled Solumedrol, Singulair, Duonebs and Xopenex  Will also consult Pulmonary service to optimize her maintenance therapy as pt has had several bouts of Asthma exacerbation within the past year       Sinus tachycardia   Assessment & Plan     Likely secondary to Albuterol use    Will switch to Xopenex and monitor on telemetry      VTE Prophylaxis: Heparin   Code Status: Level 1 - Full Code  Anticipated Length of Stay:  Patient will be admitted on an Observation basis with an anticipated length of stay of atleast 1 midnights         Admission Orders:  OBSERVATION  TELE MON  CONSULT PULMONARY  BMP CBC DIFF  DAILY WEIGHT I/O  CXR  Scheduled Meds:   Current Facility-Administered Medications:  heparin (porcine) 5,000 Units Subcutaneous Q8H Kate Robbins MD   ipratropium-albuterol 3 mL Nebulization Q6H Shavon Nielsen MD   levalbuterol 0 63 mg Nebulization Q8H PRN Shavon Nielsen MD   methylPREDNISolone sodium succinate 60 mg Intravenous Q8H [address] Thuy Oquendo MD   montelukast 10 mg Oral HS Shavon Nielsen MD     Continuous Infusions:    PRN Meds: levalbuterol Dutasteride Counseling: Dustasteride Counseling:  I discussed with the patient the risks of use of dutasteride including but not limited to decreased libido, decreased ejaculate volume, and gynecomastia. Women who can become pregnant should not handle medication.  All of the patient's questions and concerns were addressed. Dutasteride Male Counseling: Dustasteride Counseling:  I discussed with the patient the risks of use of dutasteride including but not limited to decreased libido, decreased ejaculate volume, and gynecomastia. Women who can become pregnant should not handle medication.  All of the patient's questions and concerns were addressed.